# Patient Record
Sex: FEMALE
[De-identification: names, ages, dates, MRNs, and addresses within clinical notes are randomized per-mention and may not be internally consistent; named-entity substitution may affect disease eponyms.]

---

## 2017-01-03 ENCOUNTER — HOSPITAL ENCOUNTER (INPATIENT)
Dept: HOSPITAL 80 - FED | Age: 82
LOS: 2 days | Discharge: HOME | DRG: 195 | End: 2017-01-05
Attending: INTERNAL MEDICINE | Admitting: INTERNAL MEDICINE
Payer: COMMERCIAL

## 2017-01-03 DIAGNOSIS — I10: ICD-10-CM

## 2017-01-03 DIAGNOSIS — R55: ICD-10-CM

## 2017-01-03 DIAGNOSIS — F03.90: ICD-10-CM

## 2017-01-03 DIAGNOSIS — J18.9: Primary | ICD-10-CM

## 2017-01-03 LAB
% IMMATURE GRANULYOCYTES: 0.5 % (ref 0–1.1)
ABSOLUTE IMMATURE GRANULOCYTES: 0.08 10^3/UL (ref 0–0.1)
ABSOLUTE NRBC COUNT: 0 10^3/UL (ref 0–0.01)
ADD DIFF?: NO
ADD MORPH?: NO
ADD SCAN?: NO
ANION GAP SERPL CALC-SCNC: 14 MEQ/L (ref 8–16)
APTT BLD: 25.9 SEC (ref 23–38)
ATYPICAL LYMPHOCYTE FLAG: 0 (ref 0–99)
CALCIUM SERPL-MCNC: 9.3 MG/DL (ref 8.5–10.4)
CHLORIDE SERPL-SCNC: 104 MEQ/L (ref 97–110)
CO2 SERPL-SCNC: 21 MEQ/L (ref 22–31)
COLOR UR: (no result)
CREAT SERPL-MCNC: 0.9 MG/DL (ref 0.6–1)
ERYTHROCYTE [DISTWIDTH] IN BLOOD BY AUTOMATED COUNT: 13.2 % (ref 11.5–15.2)
FRAGMENT RBC FLAG: 0 (ref 0–99)
GFR SERPL CREATININE-BSD FRML MDRD: 59 ML/MIN/{1.73_M2}
GLUCOSE SERPL-MCNC: 101 MG/DL (ref 70–100)
HCT VFR BLD CALC: 36.8 % (ref 38–47)
HGB BLD-MCNC: 12.6 G/DL (ref 12.6–16.3)
INR PPP: 1.08 (ref 0.83–1.16)
LEFT SHIFT FLG: 10 (ref 0–99)
LIPEMIA HEMOLYSIS FLAG: 90 (ref 0–99)
MCH RBC BLDCO QN: 31.3 PG (ref 27.9–34.1)
MCHC RBC AUTO-ENTMCNC: 34.2 G/DL (ref 32.4–36.7)
MCV RBC AUTO: 91.3 FL (ref 81.5–99.8)
NITRITE UR QL STRIP: NEGATIVE
NRBC-AUTO%: 0 % (ref 0–0.2)
PH UR STRIP: 8 [PH] (ref 5–7.5)
PLATELET # BLD: 247 10^3/UL (ref 150–400)
PLATELET CLUMPS FLAG: 0 (ref 0–99)
PMV BLD AUTO: 11 FL (ref 8.7–11.7)
POTASSIUM SERPL-SCNC: 4.5 MEQ/L (ref 3.5–5.2)
PROTHROMBIN TIME: 13.9 SEC (ref 12–15)
RBC # BLD AUTO: 4.03 10^6/UL (ref 4.18–5.33)
SODIUM SERPL-SCNC: 139 MEQ/L (ref 134–144)
SP GR UR STRIP: 1.01 (ref 1–1.03)
TROPONIN I SERPL-MCNC: 0.02 NG/ML (ref 0–0.03)

## 2017-01-03 PROCEDURE — G0378 HOSPITAL OBSERVATION PER HR: HCPCS

## 2017-01-03 NOTE — CPEKG
Heart Rate: 80

RR Interval: 750

P-R Interval: 156

QRSD Interval: 86

QT Interval: 356

QTC Interval: 411

P Axis: 83

QRS Axis: 76

T Wave Axis: -52

EKG Severity - ABNORMAL ECG -

EKG Impression: SINUS RHYTHM

EKG Impression: NONSPECIFIC T ABNORMALITIES, DIFFUSE LEADS

EKG Impression: similar to previous

Electronically Signed By: Akira Rodriguez 03-Jan-2017 12:48:02

## 2017-01-03 NOTE — EDPHY
H & P


Stated Complaint: SYNCOPAL


Time Seen by Provider: 01/03/17 12:39


HPI/ROS: 


CHIEF COMPLAINT:  Syncope





HISTORY OF PRESENT ILLNESS:  The patient is an 88-year-old female who comes to 

the emergency department with her  after a syncopal event.  She was in 

an exercise class with small weights when she lost consciousness and fell to 

the ground.  She denies having any pain or injury from the fall.  She denies 

any chest pain or palpitations or shortness of breath prior to the episode or 

after.  She does not take blood thinners.  She does not have any diarrhea or 

vomiting.  She used to have a diagnosis of Crohn's disease but states that she 

no longer does and that Dr. Traylor took her off of her Remicade.  She was 

admitted this summer under similar circumstances and at that time found to have 

C difficile and pneumonia.  She was treated for these appropriately.   she has 

never had any history of cardiac disease.








REVIEW OF SYSTEMS:


Constitutional:  denies: chills, fever, recent illness, recent injury


EENTM: denies: blurred vision, double vision, nose congestion


Respiratory: denies: cough, shortness of breath


Cardiac:  See HPI


Gastrointestinal/Abdominal: denies: abdominal pain, diarrhea, nausea, vomiting, 

blood streaked stools


Genitourinary: denies: dysuria, frequency, hematuria, pain


Musculoskeletal: denies: joint pain, muscle pain


Skin: denies: lesions, rash, jaundice, bruising


Neurological: denies: headache, numbness, paresthesia, tingling, dizziness, 

weakness


Hematologic/Lymphatic: denies: blood clots, easy bleeding, easy bruising


Immunologic/allergic: denies: HIV/AIDS, transplant








EXAM:


GENERAL:  Well-appearing, well-nourished and in no acute distress.


HEAD:  Atraumatic, normocephalic.


EYES:  Pupils equal round and reactive to light, extraocular movements intact, 

sclera anicteric, conjunctiva are normal.


ENT:  TMs normal, nares patent, oropharynx clear without exudates.  Moist 

mucous membranes.


NECK:  Normal range of motion, supple without lymphadenopathy or JVD.  No 

tenderness


LUNGS:  Breath sounds clear to auscultation bilaterally and equal.  No wheezes 

rales or rhonchi.


HEART:  Regular rate and rhythm without murmurs, rubs or gallops.


ABDOMEN:  Soft, nontender, normoactive bowel sounds.  No guarding, no rebound.  

No masses appreciated.


BACK:  No CVA tenderness, no spinal tenderness, step-offs or deformities


EXTREMITIES:  Normal range of motion, no pitting or edema.  No clubbing or 

cyanosis.


NEUROLOGICAL:  Cranial nerves II through XII grossly intact.  Normal speech, 

normal gait.  5/5 strength, normal movement in all extremities, normal sensation


PSYCH:  Normal mood, normal affect.


SKIN:  Warm, dry, normal turgor, no visible rashes or lesions.








Source: Patient


Exam Limitations: No limitations





- Personal History


Current Tetanus/Diphtheria Vaccine: Unsure


Current Tetanus Diphtheria and Acellular Pertussis (TDAP): Unsure





- Medical/Surgical History


Hx Asthma: No


Hx Chronic Respiratory Disease: No


Hx Diabetes: No


Hx Cardiac Disease: No


Hx Renal Disease: No


Hx Cirrhosis: No


Hx Alcoholism: No


Hx HIV/AIDS: No


Hx Splenectomy or Spleen Trauma: No


Other PMH: Crohns w/remicaide infusions,





- Family History


Significant Family History: No pertinent family hx





- Social History


Smoking Status: Never smoked


Alcohol Use: None


Drug Use: None


Constitutional: 


 Initial Vital Signs











Temperature (C)  37 C   01/03/17 11:01


 


Heart Rate  81   01/03/17 11:01


 


Respiratory Rate  19   01/03/17 11:01


 


Blood Pressure  156/77 H  01/03/17 11:01


 


O2 Sat (%)  92   01/03/17 11:01








 











O2 Delivery Mode               Room Air














Allergies/Adverse Reactions: 


 





No Known Allergies Allergy (Unverified 03/19/15 11:29)


 








Home Medications: 














 Medication  Instructions  Recorded


 


Ferrous Sulfate [Ferrous Sulf 325 325 mg PO DAILY 07/01/16





MG (*)]  


 


Multivitamins [Multivitamin (*)] 1 each PO DAILY 01/03/17














Medical Decision Making





- Diagnostics


EKG Interpretation: 


An EKG obtained and was read and documented in trace view.  Please see trace 

view for full reading and report.  Sinus rhythm inferior T-wave inversions 

similar to previous 


ED Course/Re-evaluation: 


The patient does not wish to stay in the hospital.  She does appear slightly 

dehydrated.  She is afebrile.  Her vital signs are stable.  Glucose is a normal 

limits. Her previous syncopal episode was associated with pneumonia and C diff.

  She denies having any diarrhea or shortness of breath or cough today.  I will 

obtain a chest x-ray.





2:30 p.m. we discussed the x-ray results.  Initially the patient had refused 

it.  It does reveal bilateral pneumonia.  I recommended admission.  Also 

further workup for her syncope.  She does feel better with hydration.  She and 

her  agree with this plan.  I discussed the case with Dr. Yuliya Zheng 

who will admit.  I have started her on Levaquin.  Lactic and blood cultures 

ordered.


Differential Diagnosis: 


Partial list of the Differential diagnosis considered include but were not 

limited to;  pneumonia, bronchitis, syncope, arrhythmia, dehydration, 

hypoglycemia and although unlikely based on the history and physical exam, I 

also considered seizure, acute coronary disease.  








- Data Points


Laboratory Results: 


 Laboratory Results





 01/03/17 11:50 





 01/03/17 10:50 








Medications Given: 


 








Discontinued Medications





Sodium Chloride (Ns)  1,000 mls @ 0 mls/hr IV EDNOW ONE


   PRN Reason: Wide Open


   Stop: 01/03/17 11:18


   Last Admin: 01/03/17 11:20 Dose:  1,000 mls


Sodium Chloride (Ns)  1,000 mls @ 0 mls/hr IV ONCE ONE


   PRN Reason: Wide Open


   Stop: 01/03/17 12:49


   Last Admin: 01/03/17 12:00 Dose:  500 mls


Levofloxacin/Dextrose (Levaquin 750 Mg (Premix))  150 mls @ 100 mls/hr IV DAILY 

HUMBLE


   PRN Reason: Protocol


   Stop: 02/02/17 14:59


   Last Admin: 01/03/17 15:29 Dose:  150 mls


Sodium Chloride (Ns *For Sepsis Order Set Only*)  1,388 ml IV EDNOW ONE


   Stop: 01/03/17 14:37


   Last Admin: 01/03/17 15:55 Dose:  Not Given








Departure





- Departure


Disposition: [facility] Inpatient Acute


Clinical Impression: 


 Community acquired pneumonia





Syncope


Qualifiers:


 Syncope type: unspecified Qualifier Code: (R55) Syncope and collapse


Condition: Fair

## 2017-01-03 NOTE — DX
Chest, Two Views 

January 3, 2017, 1316 Hours

 

History: Chest pain, syncope.

 

Comparison: July 2016.

 

Findings:   Cardiac silhouette is within normal range. Alveolar opacity in the left lower lobe probab
ly representing pneumonia. Opacity also in the anterior segment of the right upper lobe probably repr
esenting pneumonia. Bilateral peribronchial thickening. No pleural effusion or pneumothorax.

 

Impressions

1. Left lower lobe pneumonia.

2. Right upper lobe anterior segment pneumonia.

3. Consider CT chest imaging.

## 2017-01-03 NOTE — GHP
[f 
rep st]



                                                            HISTORY AND PHYSICAL





DATE OF ADMISSION:  01/03/2017



CHIEF COMPLAINT:  Syncope.



HISTORY OF PRESENT ILLNESS:  Patient is a pleasant 88-year-old female with 
history of Crohn's rheumatoid arthritis who presented after a syncopal event.  
She was in her exercise class this morning and suddenly fainted.  Per her 
, when she awoke she was confused for about a minute, was sweating and 
complained of nausea.  There were no jerking movements.  The patient denies 
prodrome of symptoms including chest pain, shortness of breath, nausea, vomiting
, clamminess or palpitations.  She had a similar presentation in July with a 
syncopal episode and found to have pneumonia and C difficile. 



She denies fevers, chills or sweats.  She has had decreased p.o. intake.  Per 
her , has been sleeping more at night, up to 8-9 hours plus a nap during 
the day.  She denies nausea, vomiting or diarrhea and no headache.  The  
says there has been flu like symptoms in their living facility.



REVIEW OF SYSTEMS:  A complete 10-point review of systems, negative except as 
noted in HPI.



PAST MEDICAL HISTORY:  

1.  Crohn's. 

2.  Rheumatoid arthritis.  

3.  C difficile in July 2016. 

4.  Community-acquired pneumonia in July 2016.

5.  Dementia.



PAST SURGICAL HISTORY:  None.



MEDICATIONS:  None.



SOCIAL HISTORY:  Lives at the [facility] with her  in the independent 
portion.  Has 3 kids.  No alcohol, tobacco or illicits.



FAMILY HISTORY:  Dad with hypertension.



PHYSICAL EXAM:  VITAL SIGNS:  Temperature is 37.2, blood pressure 163/89, heart 
rate 80s, respiration 16, 91% on room air.  GENERAL:  Patient is lying up in bed
, in no acute distress.  HEENT:  PERRLA.  EOMI.  Dry mucous membranes.  
Oropharynx clear without exudate or erythema.  CARDIAC:  Regular rate and 
rhythm.  No murmurs, gallops, or rubs.  LUNGS:  Clear to auscultation 
bilaterally.  ABDOMEN:  Soft, nontender, nondistended.  Positive bowel sounds.  
No suprapubic tenderness.  MUSCULOSKELETAL:  Moving all 4 extremities.  NEURO:  
2 through 12 intact.  No focal deficits.  PSYCH:  Alert and oriented x3.



LABS:  WBC 17, hemoglobin 12, hematocrit 36, platelets 247.  Lactate is 1.3.  
Sodium is 139, potassium 4.5, chloride 104, carbon dioxide 21, BUN 13, 
creatinine 0.9, anion gap 14, glucose 101, calcium 9.3.  Troponin 0.019. 



EKG is personally reviewed by me.  ST flattening in V4 through V6. 



Chest x-ray is personally reviewed by me.  Small left lower lobe opacity, no 
effusion.



ASSESSMENT/PLAN:  

1.  Syncope, differential includes infection versus cardiac versus dehydration.
  The patient does not elicit overt infectious symptoms but has had decreased 
p.o. intake and more fatigued.  She has an elevated white count to 17.  There 
is a subtle left lower lobe opacity noted on x-ray.  She was treated with 
Levaquin in the emergency room.  I will continue this medication.  Also check 
influenza and a UA. Subtle left lower lobe pneumonia. Blood cultures are 
pending.  Will monitor patient on telemetry.  EKG and troponin negative for 
ischemia.  Repeat troponin.

2.  Dementia.  Patient lives independently at the [facility] with her .

3.  Accelerated hypertension.  Suspect this is due to acute illness.  Will 
monitor.  

4.  Diet:  Regular.

5.  DVT prophylaxis:  Low molecular weight heparin.



DISPOSITION:  Patient warrants observation admission given acute syncopal 
episode warranting serial troponin and telemetry.  If patient is feeling better 
in the morning, suspect that she can be discharged with her .





Job #:  [id number]

MTDD

## 2017-01-04 LAB
ANION GAP SERPL CALC-SCNC: 12 MEQ/L (ref 8–16)
CALCIUM SERPL-MCNC: 9.3 MG/DL (ref 8.5–10.4)
CHLORIDE SERPL-SCNC: 105 MEQ/L (ref 97–110)
CO2 SERPL-SCNC: 23 MEQ/L (ref 22–31)
CREAT SERPL-MCNC: 0.8 MG/DL (ref 0.6–1)
ERYTHROCYTE [DISTWIDTH] IN BLOOD BY AUTOMATED COUNT: 13.2 % (ref 11.5–15.2)
GFR SERPL CREATININE-BSD FRML MDRD: > 60 ML/MIN/{1.73_M2}
GLUCOSE SERPL-MCNC: 107 MG/DL (ref 70–100)
HCT VFR BLD CALC: 37.1 % (ref 38–47)
HGB BLD-MCNC: 12.8 G/DL (ref 12.6–16.3)
LIPEMIA HEMOLYSIS FLAG: 90 (ref 0–99)
MCH RBC BLDCO QN: 30.9 PG (ref 27.9–34.1)
MCHC RBC AUTO-ENTMCNC: 34.5 G/DL (ref 32.4–36.7)
MCV RBC AUTO: 89.6 FL (ref 81.5–99.8)
PLATELET # BLD: 275 10^3/UL (ref 150–400)
PLATELET CLUMPS FLAG: 99 (ref 0–99)
POTASSIUM SERPL-SCNC: 4.3 MEQ/L (ref 3.5–5.2)
RBC # BLD AUTO: 4.14 10^6/UL (ref 4.18–5.33)
SODIUM SERPL-SCNC: 140 MEQ/L (ref 134–144)

## 2017-01-04 RX ADMIN — VANCOMYCIN HYDROCHLORIDE SCH MG: KIT at 15:09

## 2017-01-04 RX ADMIN — VANCOMYCIN HYDROCHLORIDE SCH MG: KIT at 20:06

## 2017-01-04 RX ADMIN — IRON SUPPLEMENT SCH MG: 325 TABLET ORAL at 09:26

## 2017-01-04 RX ADMIN — THERA TABS SCH EACH: TAB at 09:26

## 2017-01-04 RX ADMIN — ENOXAPARIN SODIUM SCH MG: 100 INJECTION SUBCUTANEOUS at 09:26

## 2017-01-04 NOTE — HOSPPROG
Hospitalist Progress Note


Assessment/Plan: 


Patient is a 89y/o female who had a syncopal event after doing an exercise 

class.  Reviewed her H & P, PMH, today is my first day caring for Shikha.  





#. CAP


Levaquin/ chest xray shows a left lower lobe





#.  leukocytosis


 white blood cell count increased today


 awaiting blood cultures


 negative for influenza





#.  syncopal event


 troponin negative


 patient's was stating she was doing a workout class and fell and has no 

recollection


 will get an echocardiogram  for further evaluation


  reviewed her telemetry monitor.  She has been in sinus rhythm.


 will ask the nursing staff to do 1 set of orthostatic vital signs





#.  history of Clostridium difficile in 2016


 because of the elevated white blood cell count, will prophylactically treat 

her with oral vancomycin twice daily  while on antibiotics





#.  dementia


 this appears to be very mild during my interview.  She lives independently at 

the [facility] with her 





#.  hypertension


 blood pressure is 142/80.  Will continue monitoring





3.  history of Crohn's disease/treated with Remicade


 has seen Dr. Traylor in the past for this





#.  DVT prophylaxis/ low molecular weight heparin





#.  plan.  Patient require another midnight stay.  I am concerned that her 

white blood cell count has increased.  Will get repeat labs in the morning, and 

echocardiogram, and a set of orthostatic vital signs.








Subjective: Shikha has no complaints of pain.  Overall is feeling fine


Objective: 


 Vital Signs











Temp Pulse Resp BP Pulse Ox


 


 36.6 C   79   16   142/80 H  94 


 


 01/04/17 08:00  01/04/17 08:00  01/04/17 08:00  01/04/17 08:00  01/04/17 08:00








 Laboratory Results





 01/04/17 05:36 





 01/04/17 05:36 





 











 01/03/17 01/04/17 01/05/17





 05:59 05:59 05:59


 


Intake Total  1500 


 


Output Total  550 


 


Balance  950 








 











PT  13.9 SEC (12.0-15.0)   01/03/17  15:48    


 


INR  1.08  (0.83-1.16)   01/03/17  15:48    














- Physical Exam


Constitutional: no apparent distress, appears nourished, not in pain


Eyes: PERRL, EOMI


Cardiovascular: regular rate and rhythym, no murmur, rub, or gallop


Respiratory: no respiratory distress, no rales or rhonchi, reduced air movement 

( left base)


Gastrointestinal: normoactive bowel sounds, soft, non-tender abdomen


Musculoskeletal: full muscle strength, no muscle tenderness


Neurologic: AAOx3


Psychiatric: interacting appropriately, not anxious





ICD10 Worksheet


Patient Problems: 


 Problems











Problem Status Diagnosed


 


Chronic Disease Mgmt/Transitional Care Acute 


 


Community acquired pneumonia Acute 


 


Leukocytosis Acute 


 


Syncope Acute 


 


C. difficile diarrhea Acute 07/02/16

## 2017-01-05 VITALS
SYSTOLIC BLOOD PRESSURE: 115 MMHG | OXYGEN SATURATION: 92 % | TEMPERATURE: 97.8 F | RESPIRATION RATE: 16 BRPM | DIASTOLIC BLOOD PRESSURE: 74 MMHG

## 2017-01-05 VITALS — HEART RATE: 91 BPM

## 2017-01-05 LAB
% IMMATURE GRANULYOCYTES: 0.5 % (ref 0–1.1)
ABSOLUTE IMMATURE GRANULOCYTES: 0.07 10^3/UL (ref 0–0.1)
ABSOLUTE NRBC COUNT: 0 10^3/UL (ref 0–0.01)
ADD DIFF?: NO
ADD MORPH?: NO
ADD SCAN?: NO
ANION GAP SERPL CALC-SCNC: 9 MEQ/L (ref 8–16)
ATYPICAL LYMPHOCYTE FLAG: 0 (ref 0–99)
CALCIUM SERPL-MCNC: 9 MG/DL (ref 8.5–10.4)
CHLORIDE SERPL-SCNC: 105 MEQ/L (ref 97–110)
CO2 SERPL-SCNC: 23 MEQ/L (ref 22–31)
CREAT SERPL-MCNC: 0.8 MG/DL (ref 0.6–1)
ERYTHROCYTE [DISTWIDTH] IN BLOOD BY AUTOMATED COUNT: 13.1 % (ref 11.5–15.2)
FRAGMENT RBC FLAG: 0 (ref 0–99)
GFR SERPL CREATININE-BSD FRML MDRD: > 60 ML/MIN/{1.73_M2}
GLUCOSE SERPL-MCNC: 99 MG/DL (ref 70–100)
HCT VFR BLD CALC: 34.4 % (ref 38–47)
HGB BLD-MCNC: 11.9 G/DL (ref 12.6–16.3)
LEFT SHIFT FLG: 10 (ref 0–99)
LIPEMIA HEMOLYSIS FLAG: 90 (ref 0–99)
MCH RBC BLDCO QN: 31.2 PG (ref 27.9–34.1)
MCHC RBC AUTO-ENTMCNC: 34.6 G/DL (ref 32.4–36.7)
MCV RBC AUTO: 90.1 FL (ref 81.5–99.8)
NRBC-AUTO%: 0 % (ref 0–0.2)
PLATELET # BLD: 225 10^3/UL (ref 150–400)
PLATELET CLUMPS FLAG: 0 (ref 0–99)
PMV BLD AUTO: 11.2 FL (ref 8.7–11.7)
POTASSIUM SERPL-SCNC: 4 MEQ/L (ref 3.5–5.2)
RBC # BLD AUTO: 3.82 10^6/UL (ref 4.18–5.33)
SODIUM SERPL-SCNC: 137 MEQ/L (ref 134–144)

## 2017-01-05 RX ADMIN — ENOXAPARIN SODIUM SCH MG: 100 INJECTION SUBCUTANEOUS at 09:36

## 2017-01-05 RX ADMIN — IRON SUPPLEMENT SCH MG: 325 TABLET ORAL at 09:37

## 2017-01-05 RX ADMIN — VANCOMYCIN HYDROCHLORIDE SCH MG: KIT at 09:37

## 2017-01-05 RX ADMIN — THERA TABS SCH EACH: TAB at 09:37

## 2017-01-05 NOTE — HOSPPROG
Hospitalist Progress Note


Assessment/Plan: 


Patient is a 87y/o female who had a syncopal event after doing an exercise 

class.  





#. CAP


Levaquin/ chest xray shows a left lower lobe


blood cx show no growth thus far





#.  leukocytosis


improved today


 negative for influenza





#.  syncopal event


 troponin negative


 patient's was stating she was doing a workout class and fell and has no 

recollection


 echocardiogram Shows normal LV function.  EF 65-70%.  Has moderate TR and mild 

MR


  reviewed her telemetry monitor.  She has been in sinus rhythm.


  orthostatic stable/ will arrange for her to get a Holter monitor and further 

follow up with Cardiology


 suspect she may have been slightly dehydrated when she attended her exercise 

class





#.  history of Clostridium difficile in 2016


 no diarrhea





#.  dementia


 this appears to be very mild during my interview.  She lives independently at 

the [facility] with her 





#.  hypertension


 blood pressure is 141/75  





3.  history of Crohn's disease/treated with Remicade


 has seen Dr. Traylor in the past for this





#.  DVT prophylaxis/ low molecular weight heparin





#.  plan.  discharge home.  Due to the snow, case Management is actively 

involved arranging for her to get home








Subjective: Shikha is anxious to be discharged home immediately.


Objective: 


 Vital Signs











Temp Pulse Resp BP Pulse Ox


 


 36.5 C   91   18   141/75 H  91 L


 


 01/05/17 04:00  01/05/17 04:00  01/05/17 04:00  01/05/17 04:00  01/05/17 04:00








 Laboratory Results





 01/05/17 05:17 





 01/05/17 05:17 





 











 01/04/17 01/05/17 01/06/17





 05:59 05:59 05:59


 


Intake Total  800 


 


Output Total  600 


 


Balance  200 








 











PT  13.9 SEC (12.0-15.0)   01/03/17  15:48    


 


INR  1.08  (0.83-1.16)   01/03/17  15:48    














- Physical Exam


Constitutional: no apparent distress, appears nourished


Eyes: PERRL


Ears, Nose, Mouth, Throat: hearing normal


Cardiovascular: regular rate and rhythym, no murmur, rub, or gallop


Respiratory: no respiratory distress, no rales or rhonchi, reduced air movement 

( left base)


Gastrointestinal: normoactive bowel sounds, soft, non-tender abdomen


Skin: warm


Musculoskeletal: full muscle strength


Neurologic: AAOx3


Psychiatric: interacting appropriately, poor insight, poor memory





ICD10 Worksheet


Patient Problems: 


 Problems











Problem Status Diagnosed


 


Chronic Disease Mgmt/Transitional Care Acute 


 


Community acquired pneumonia Acute 


 


Leukocytosis Acute 


 


Syncope Acute 


 


C. difficile diarrhea Acute 07/02/16

## 2017-01-05 NOTE — GDS
[f 
rep st]



                                                             DISCHARGE SUMMARY





DISCHARGE DIAGNOSES:  

1.  Community-acquired pneumonia.  

2.  Leukocytosis. 

3.  Syncopal event.

4.  History of Clostridium difficile in 2016. 

5.  Dementia. 

6.  Hypertension. 

7.  History of Crohn disease, had been treated with Remicade in the past.



BRIEF HISTORY OF PRESENT ILLNESS:  Patient is an 88-year-old female with a 
history of Crohn's inflammatory arthritis, who presented after a syncopal 
event.  She was in her exercise class and suddenly fainted.  Per her , 
when she awoke she was confused.  She had no prodrome of symptoms.  Per her 
, she has not been taking in as much intake.  She was admitted for 
further evaluation.



HOSPITAL COURSE:  

1.  Community-acquired pneumonia.  She had a chest x-ray performed which showed 
a left lower lobe pneumonia and right upper lobe anterior segment pneumonia.  
She was treated with Levaquin.  Her current blood cultures show no growth.  
Recommended that she follow up with her primary care provider and get a repeat 
chest x-ray in 4-6 weeks.

2.  Leukocytosis.  This is improved.  She has been afebrile.  She is negative 
for influenza.

3.  Syncopal event.  Troponin are negative.  She has been in a sinus rhythm.  
An echocardiogram was performed which showed normal LV function with an EF of 65
% to 70%.  She has moderate TR and mild MR.  Her orthostatic vital signs are 
stable.  She will get an outpatient Holter monitor and follow up with 
Cardiology.  Cardiology will contact her tomorrow for follow up.

4.  Clostridium difficile in 2016.  No diarrhea. 

5.  Dementia, overall stable.

6.  Hypertension.  Blood pressure is 141/75.

7.  History of Crohn disease.  This was treated with Remicade in the past.  
Further follow up with Dr. Traylor.



PENDING LABS AND TESTS:  None.



CONDITION AT DISCHARGE:  Stable.  Blood pressure is 115/74, heart rate is 91, 
respiratory rate 16, O2 saturation on room air 92%, temperature 36.6 Celsius.



MEDICATIONS AT DISCHARGE:  Please see the EMR.



DISCHARGE INSTRUCTIONS:  

1.  Stay well hydrated and eat well before working out.

2.  [facility] will call her to arrange for Holter monitor to be sent to her 
and to get further followup appointment.

3.  Take Levaquin as prescribed.  Recommend that she take it easy while on this 
medication because it can cause tendon rupture.

4.  Get a repeat chest x-ray in 4-6 weeks to make sure her pneumonia is cleared 
up. 

Greater than 30 minutes discharging and coordinating care.





Job #:  [id number]

MTDD

## 2017-02-13 ENCOUNTER — HOSPITAL ENCOUNTER (OUTPATIENT)
Dept: HOSPITAL 80 - BMCIMAGING | Age: 82
End: 2017-02-13
Attending: INTERNAL MEDICINE
Payer: COMMERCIAL

## 2017-02-13 DIAGNOSIS — J44.9: ICD-10-CM

## 2017-02-13 DIAGNOSIS — J18.1: Primary | ICD-10-CM

## 2017-02-13 NOTE — DX
Chest, Two Views - February 13, 2016, at 1351 hours



History:  Pneumonia, follow up J18.8.



Comparison: Chest x-ray January 3, 2017 CT chest July 2016.



Findings: Cardiac silhouette is within normal range. Hyperinflation of the lungs, consistent with 
D. Lower thoracic levoscoliosis. Atherosclerotic aorta.



Increasing opacity in the left lower lobe posterior basal segment and in the lingula, consistent with
 worsening pneumonia. Increased linear density in the right major fissure representing scarring or pn
eumonitis in the anterior segment of the right upper lobe.



Impression:

1. Worsening left lower lobe and lingula pneumonia.

2. Probable pneumonia or atelectasis in the anterior segment of the right upper lobe with linear dens
ity.

3. COPD.

4. Consider additional CT chest imaging if clinically indicated.

## 2017-02-15 ENCOUNTER — HOSPITAL ENCOUNTER (OUTPATIENT)
Dept: HOSPITAL 80 - FIMAGING | Age: 82
End: 2017-02-15
Attending: INTERNAL MEDICINE
Payer: COMMERCIAL

## 2017-02-15 DIAGNOSIS — R91.8: Primary | ICD-10-CM

## 2017-02-15 PROCEDURE — 71260 CT THORAX DX C+: CPT

## 2017-02-28 ENCOUNTER — HOSPITAL ENCOUNTER (OUTPATIENT)
Dept: HOSPITAL 80 - BHFA | Age: 82
End: 2017-02-28
Payer: COMMERCIAL

## 2017-02-28 DIAGNOSIS — R55: Primary | ICD-10-CM

## 2017-08-07 ENCOUNTER — HOSPITAL ENCOUNTER (OUTPATIENT)
Dept: HOSPITAL 80 - BMCIMAGING | Age: 82
End: 2017-08-07
Attending: INTERNAL MEDICINE
Payer: COMMERCIAL

## 2017-08-07 DIAGNOSIS — R63.4: Primary | ICD-10-CM

## 2017-08-14 ENCOUNTER — HOSPITAL ENCOUNTER (INPATIENT)
Dept: HOSPITAL 80 - FED | Age: 82
LOS: 3 days | Discharge: HOME HEALTH | DRG: 640 | End: 2017-08-17
Attending: INTERNAL MEDICINE | Admitting: INTERNAL MEDICINE
Payer: COMMERCIAL

## 2017-08-14 DIAGNOSIS — F50.89: ICD-10-CM

## 2017-08-14 DIAGNOSIS — R63.0: Primary | ICD-10-CM

## 2017-08-14 DIAGNOSIS — D63.8: ICD-10-CM

## 2017-08-14 DIAGNOSIS — R62.7: ICD-10-CM

## 2017-08-14 DIAGNOSIS — E43: ICD-10-CM

## 2017-08-14 DIAGNOSIS — F03.90: ICD-10-CM

## 2017-08-14 DIAGNOSIS — K44.9: ICD-10-CM

## 2017-08-14 DIAGNOSIS — K50.90: ICD-10-CM

## 2017-08-14 DIAGNOSIS — E87.1: ICD-10-CM

## 2017-08-14 LAB
% IMMATURE GRANULYOCYTES: 0.7 % (ref 0–1.1)
ABSOLUTE IMMATURE GRANULOCYTES: 0.11 10^3/UL (ref 0–0.1)
ABSOLUTE NRBC COUNT: 0 10^3/UL (ref 0–0.01)
ADD DIFF?: NO
ADD MORPH?: NO
ADD SCAN?: NO
ANION GAP SERPL CALC-SCNC: 7 MEQ/L (ref 8–16)
ATYPICAL LYMPHOCYTE FLAG: 10 (ref 0–99)
BACTERIA #/AREA URNS HPF: (no result) /HPF
CALCIUM SERPL-MCNC: 9 MG/DL (ref 8.5–10.4)
CHLORIDE SERPL-SCNC: 100 MEQ/L (ref 97–110)
CO2 SERPL-SCNC: 25 MEQ/L (ref 22–31)
COLOR UR: YELLOW
CREAT SERPL-MCNC: 0.8 MG/DL (ref 0.6–1)
ERYTHROCYTE [DISTWIDTH] IN BLOOD BY AUTOMATED COUNT: 12.7 % (ref 11.5–15.2)
FRAGMENT RBC FLAG: 0 (ref 0–99)
GFR SERPL CREATININE-BSD FRML MDRD: > 60 ML/MIN/{1.73_M2}
GLUCOSE SERPL-MCNC: 83 MG/DL (ref 70–100)
HCT VFR BLD CALC: 35.1 % (ref 38–47)
HGB BLD-MCNC: 11.5 G/DL (ref 12.6–16.3)
LEFT SHIFT FLG: 10 (ref 0–99)
LIPEMIA HEMOLYSIS FLAG: 80 (ref 0–99)
MCH RBC BLDCO QN: 29.5 PG (ref 27.9–34.1)
MCHC RBC AUTO-ENTMCNC: 32.8 G/DL (ref 32.4–36.7)
MCV RBC AUTO: 90 FL (ref 81.5–99.8)
MUCOUS THREADS #/AREA URNS LPF: (no result) /LPF
NITRITE UR QL STRIP: NEGATIVE
NRBC-AUTO%: 0 % (ref 0–0.2)
PH UR STRIP: 5 [PH] (ref 5–7.5)
PLATELET # BLD: 547 10^3/UL (ref 150–400)
PLATELET CLUMPS FLAG: 0 (ref 0–99)
PMV BLD AUTO: 9.3 FL (ref 8.7–11.7)
POTASSIUM SERPL-SCNC: 4.6 MEQ/L (ref 3.5–5.2)
RBC # BLD AUTO: 3.9 10^6/UL (ref 4.18–5.33)
RBC #/AREA URNS HPF: (no result) /HPF (ref 0–3)
SODIUM SERPL-SCNC: 132 MEQ/L (ref 134–144)
SP GR UR STRIP: > 1.035 (ref 1–1.03)
WBC #/AREA URNS HPF: (no result) /HPF (ref 0–3)

## 2017-08-14 PROCEDURE — C1751 CATH, INF, PER/CENT/MIDLINE: HCPCS

## 2017-08-14 PROCEDURE — A9537 TC99M MEBROFENIN: HCPCS

## 2017-08-14 RX ADMIN — SODIUM CHLORIDE SCH MLS: 900 INJECTION, SOLUTION INTRAVENOUS at 18:09

## 2017-08-14 RX ADMIN — SUCRALFATE SCH GM: 1 TABLET ORAL at 20:27

## 2017-08-14 RX ADMIN — PANTOPRAZOLE SODIUM SCH MG: 40 TABLET, DELAYED RELEASE ORAL at 20:27

## 2017-08-14 NOTE — CPEKG
Heart Rate: 78

RR Interval: 769

P-R Interval: 144

QRSD Interval: 82

QT Interval: 368

QTC Interval: 420

P Axis: 87

QRS Axis: 76

T Wave Axis: 51

EKG Severity - NORMAL ECG -

EKG Impression: SINUS RHYTHM

Electronically Signed By: Frieda Hoff 14-Aug-2017 21:42:40

## 2017-08-14 NOTE — EDPHY
H & P


Time Seen by Provider: 08/14/17 14:17


HPI/ROS: 


CHIEF COMPLAINT:  Lack of appetite, generalized weakness





HISTORY OF PRESENT ILLNESS: The patient is an 88-year-old female presenting 

with lack of appetite.  Onset of lack of appetite 2.5 weeks ago.  Associated 

with gradually an 8 lb weight loss, increasing generalized weakness and 

fatigue.  She has been mainly lying in bed and sleeping a lot.  The patient 

denies abdominal pain, nausea, vomiting, or diarrhea.  The patient and her 

 feel that the symptoms are secondary to Crohn's disease.  However she 

has no abdominal pain or GI symptoms. The patient was on Remicade for 10 years 

after presenting with similar symptoms. She was asymptomatic during that 10 

year period.  She was told to stop Remicade 1 year ago.  Over the past 2 and 

half weeks, she has seen her PCP, a GI specialist and hematologist for the 

symptoms. Patient is told she has severe anemia and an outpatient abdomen CT 

was ordered. 





REVIEW OF SYSTEMS:


A comprehensive 10 point review of systems is otherwise negative aside from 

elements mentioned in the history of present illness.


Past Medical/Surgical History: 





Anemia, Crohn's previously on Remicade. 


Social History: 


. Lives with  at home. 


Smoking Status: Never smoked


Physical Exam: 





General Appearance: Alert, pleasant


Eyes: Pupils equal and round, no conjunctival pallor or injection


ENT, Mouth: Mucous membranes moist


Neck: Normal inspection


Respiratory: Lungs are clear to auscultation


Cardiovascular: Regular rate and rhythm 


Gastrointestinal:  Abdomen is soft and non-tender 


Neurological:  A&O, nonfocal exam


Skin:  Pale appearing


Extremities:  Normal inspection, no tenderness


Psychiatric: Mood and affect normal





Constitutional: 


 Initial Vital Signs











Temperature (C)  36.9 C   08/14/17 12:49


 


Heart Rate  86   08/14/17 12:49


 


Respiratory Rate  16   08/14/17 12:49


 


Blood Pressure  121/62 H  08/14/17 12:49


 


O2 Sat (%)  94   08/14/17 12:49








 











O2 Delivery Mode               Room Air














Allergies/Adverse Reactions: 


 





No Known Allergies Allergy (Verified 08/14/17 12:48)


 








Home Medications: 














 Medication  Instructions  Recorded


 


Bifidobacterium Infantis [Align] 4 mg PO DAILY 08/14/17


 


Multivitamins [Multivitamin (*)] 1 each PO DAILY 08/14/17














Medical Decision Making





- Diagnostics


EKG Interpretation: 





EKG interpreted by me reveals normal sinus rhythm, normal axis, normal intervals

, ST and T segments normal.  Interpretation: normal EKG


     


Imaging: Discussed imaging studies w/ On call Radiologist


ED Course/Re-evaluation: 





This patient presents with lack of appetite, generalized weakness and 

leukocytosis.  Old medical record reviewed.  IV normal saline given for 

dehydration.  Plan to check lab work. CT abdomen/pelvis ordered.  





CT is unchanged from prior CT I reveals inflammation of the descending colon. 

Please see imaging section for the full radiology report. 





1610: I consulted the hospitalist, Dr. Cuevas, who accepts patient for 

admission. 


Differential Diagnosis: 





Differential diagnosis includes though it is not limited to appendicitis, 

cholecystitis, diverticulitis, pyelonephritis, bowel perforation, small bowel 

obstruction.





- Data Points


Laboratory Results: 


 Laboratory Results





 08/14/17 15:16 





 08/14/17 15:16 








Medications Given: 


 





Enoxaparin Sodium (Lovenox)  40 mg SC DAILY HUMBLE


   Stop: 02/11/18 08:59


   Last Admin: 08/15/17 11:49 Dose:  Not Given


Sodium Chloride (Ns)  1,000 mls @ 100 mls/hr IV CONT HUMBLE


   Stop: 02/10/18 17:29


   Last Admin: 08/14/17 18:09 Dose:  1,000 mls


Megestrol Acetate (Megace)  40 mg PO QID HUMBLE


   Stop: 02/11/18 15:59


   Last Admin: 08/15/17 18:19 Dose:  Not Given


Miscellaneous Medication (Bifidobacterium Infantis [Align])  4 mg PO DAILY HUMBLE


   Stop: 02/11/18 08:59


   Last Admin: 08/15/17 09:18 Dose:  Not Given


Multivitamins (Tab-A-Mao)  1 each PO DAILY HUMBLE


   Stop: 02/11/18 08:59


   Last Admin: 08/15/17 09:14 Dose:  1 each


Pantoprazole Sodium (Protonix)  40 mg PO BID HUMBLE


   Stop: 02/10/18 20:59


   Last Admin: 08/15/17 09:14 Dose:  40 mg


Sucralfate (Carafate)  1 gm PO ACHS HUMBLE


   Stop: 02/10/18 20:59


   Last Admin: 08/15/17 18:19 Dose:  Not Given





Discontinued Medications





Diphtheria/Tetanus/Acell Pertussis (Boostrix)  0.5 ml IM .ONCE ONE


   Stop: 08/14/17 14:47


   Last Admin: 08/14/17 15:07 Dose:  Not Given


Sodium Chloride (Ns)  1,000 mls @ 0 mls/hr IV EDNOW ONE; Wide Open


   PRN Reason: Protocol


   Stop: 08/14/17 12:57


   Last Admin: 08/14/17 14:33 Dose:  1,000 mls


Cefazolin Sodium/Dextrose (Ancef 1 Gm (Premix))  50 mls @ 200 mls/hr IV EDNOW 

ONE


   PRN Reason: Protocol


   Stop: 08/14/17 14:59


   Last Admin: 08/14/17 18:15 Dose:  Not Given








Departure





- Departure


Disposition: [facility] Inpatient Acute


Clinical Impression: 


 Generalized weakness





Failure to thrive


Qualifiers:


 Failure to thrive age range: in adult Qualified Code(s): R62.7 - Adult failure 

to thrive





Condition: Fair


Report Scribed for: Frieda Hoff


Report Scribed by: Alexandra Gundersen


Date of Report: 08/14/17


Time of Report: 14:32


Physician Review and Approval Statement: 





08/14/17 14:32


Portions of this note were transcribed by a medical scribe. I personally 

performed the history, physical exam, and medical decision-making; and 

confirmed the accuracy of the information in the transcribed note.

## 2017-08-14 NOTE — PDGENHP
History and Physical





- Chief Complaint


Acute anorexia





- History of Present Illness


PCP: Dr. Armenta


GI: Dr. Tarylor


Heme: Dr. Sanchez





HPI: 87 yo F p/w acute anorexia characterized as very poor oral intake of both 

solids and liquids with associated fatigue and declining ambulatory abilities, 

with onset of symptoms 2.5 weeks ago, duration persistent thereafter. She 

denies any overt nausea/abdominal pain, does have approx 1 loose, non-bloody BM 

daily. She denies urinary symptoms, denies fever/chills, denies dysphagia. She 

reports that she feels hungry, but then her  also states that often, 

after just a couple bites of food, patient stops eating w/o particular 

explanation. This is in the context of approx 6 month "decline" per , 

occurring approx 1 year after stopping Remicade, which she had been receiving q 

2 weeks for the past 15 years. She been seen by her PCP office three times for 

this issue in the recent past, and has also seen Dr. Sanchez for persistent 

leukocytosis (believed to be 2/2 MGUS) recently. 





History Information





- Allergies/Home Medication List


Allergies/Adverse Reactions: 








No Known Allergies Allergy (Verified 08/14/17 12:48)


 





Home Medications: 








Bifidobacterium Infantis [Align] 4 mg PO DAILY 08/14/17 [Last Taken 08/13/17]


Multivitamins [Multivitamin (*)] 1 each PO DAILY 08/14/17 [Last Taken 08/13/17]





I have personally reviewed and updated: family history, medical history, social 

history, surgical history





- Past Medical History


Additional medical history: Reported Crohn's disease w/ 15 years of remicade, 

stopped 18 months ago b/c no active symptoms.  Dementia w/ baseline AAOx3 and 

able to converse, main sx is perseveration and poor insight.  HTN.  C.Diff.  

MGUS





- Surgical History


Additional surgical history: Last EGD/Colon in 2006, reportedly unremarkable





- Family History


Additional family history: Father HTN, no recent sick family contacts





- Social History


Smoking Status: Never smoked


Alcohol Use: None


Drug Use: None


Additional social history: lives at [place] with , retired [employer] professor





Review of Systems


ROS: 10pt was reviewed & negative except for what was stated in HPI & below


Constitutional: Reports: weakness, weight loss, other (fatigue, anorexia)





Physical Exam














Temp Pulse Resp BP Pulse Ox


 


 36.7 C   78   16   122/81 H  96 


 


 08/14/17 17:32  08/14/17 17:32  08/14/17 17:32  08/14/17 17:32  08/14/17 17:32











Constitutional: no apparent distress, not in pain, chronically ill appearing, 

cachectic, No uncomfortable


Eyes: PERRL, anicteric sclera, EOMI


Ears, Nose, Mouth, Throat: moist mucous membranes, hearing normal, ears appear 

normal, no oral mucosal ulcers


Cardiovascular: regular rate and rhythym, no murmur, rub, or gallop, No edema


Respiratory: no respiratory distress, no rales or rhonchi, clear to auscultation


Gastrointestinal: normoactive bowel sounds, soft, non-tender abdomen, no 

palpable masses, No distension


Genitourinary: no bladder fullness, no bladder tenderness


Musculoskeletal: other (prox muscle wasting)


Neurologic: AAOx3, sensation intact bilaterally, No weakness (motor 5/5 bilat)


Psychiatric: not encephalopathic, anxious, other (concentration 7/7, 

perseverating w/o insight), No agitated





Lab Data & Imaging Review





 08/14/17 15:16





 08/14/17 15:16














WBC  15.38 10^3/uL (3.80-9.50)  H  08/14/17  15:16    


 


RBC  3.90 10^6/uL (4.18-5.33)  L  08/14/17  15:16    


 


Hgb  11.5 g/dL (12.6-16.3)  L  08/14/17  15:16    


 


POC Hgb  12.6 gm/dL (12.6-16.3)   08/14/17  15:14    


 


Hct  35.1 % (38.0-47.0)  L  08/14/17  15:16    


 


POC Hct  37 % (38-47)  L  08/14/17  15:14    


 


MCV  90.0 fL (81.5-99.8)   08/14/17  15:16    


 


MCH  29.5 pg (27.9-34.1)   08/14/17  15:16    


 


MCHC  32.8 g/dL (32.4-36.7)   08/14/17  15:16    


 


RDW  12.7 % (11.5-15.2)   08/14/17  15:16    


 


Plt Count  547 10^3/uL (150-400)  H  08/14/17  15:16    


 


MPV  9.3 fL (8.7-11.7)   08/14/17  15:16    


 


Neut % (Auto)  76.1 % (39.3-74.2)  H  08/14/17  15:16    


 


Lymph % (Auto)  15.5 % (15.0-45.0)   08/14/17  15:16    


 


Mono % (Auto)  7.2 % (4.5-13.0)   08/14/17  15:16    


 


Eos % (Auto)  0.1 % (0.6-7.6)  L  08/14/17  15:16    


 


Baso % (Auto)  0.4 % (0.3-1.7)   08/14/17  15:16    


 


Nucleat RBC Rel Count  0.0 % (0.0-0.2)   08/14/17  15:16    


 


Absolute Neuts (auto)  11.70 10^3/uL (1.70-6.50)  H  08/14/17  15:16    


 


Absolute Lymphs (auto)  2.39 10^3/uL (1.00-3.00)   08/14/17  15:16    


 


Absolute Monos (auto)  1.11 10^3/uL (0.30-0.80)  H  08/14/17  15:16    


 


Absolute Eos (auto)  0.01 10^3/uL (0.03-0.40)  L  08/14/17  15:16    


 


Absolute Basos (auto)  0.06 10^3/uL (0.02-0.10)   08/14/17  15:16    


 


Absolute Nucleated RBC  0.00 10^3/uL (0-0.01)   08/14/17  15:16    


 


Immature Gran %  0.7 % (0.0-1.1)   08/14/17  15:16    


 


Immature Gran #  0.11 10^3/uL (0.00-0.10)  H  08/14/17  15:16    


 


POC Sodium  136 mEq/L (134-144)   08/14/17  15:14    


 


Sodium  132 mEq/L (134-144)  L  08/14/17  15:16    


 


POC Potassium  4.2 mEq/L (3.3-5.0)   08/14/17  15:14    


 


Potassium  4.6 mEq/L (3.5-5.2)   08/14/17  15:16    


 


POC Chloride  100 mEq/L ()   08/14/17  15:14    


 


Chloride  100 mEq/L ()   08/14/17  15:16    


 


Carbon Dioxide  25 mEq/l (22-31)   08/14/17  15:16    


 


Anion Gap  7 mEq/L (8-16)  L  08/14/17  15:16    


 


POC BUN  11 mg/dL (7-23)   08/14/17  15:14    


 


BUN  12 mg/dL (7-23)   08/14/17  15:16    


 


Creatinine  0.8 mg/dL (0.6-1.0)   08/14/17  15:16    


 


POC Creatinine  0.9 mg/dL (0.6-1.0)   08/14/17  15:14    


 


Estimated GFR  > 60   08/14/17  15:16    


 


Glucose  83 mg/dL ()   08/14/17  15:16    


 


POC Glucose  89 mg/dL ()   08/14/17  15:14    


 


Calcium  9.0 mg/dL (8.5-10.4)   08/14/17  15:16    








Visualized and Interpreted Chest x-ray results: Yes


Chest X-Ray results: no infiltrate (hyperinflated)


Visualized and Interpreted EKG results: Yes


EKG Interpretation: Positive for: normal sinsus rhythm





Assessment & Plan


Assessment: 








87 yo F p/w acute hyponatremia in setting of anorexia, colitis





Plan: 





# Hyponatremia. Acute, 2/2 hypovolemia and poor oral intake


- give NS 100cc/hr, repeat sNa in AM





# Anorexia. Acute, new problem, further w/u indicated. Unclear etiology, has 

many pro-inflammatory markers (outside records reviewed including ESR 70, CRP 

124, LDH nl, SAMEER elevated from 8/11/17) as well as anemia of chronic 

inflammatory disease (iron 21, TIBC 175, sat 12%, and ferritin elevated at 540)

, and it is unclear whether this is all 2/2 atypical symptoms of Crohn's 

disease vs. other malignant process vs. upper GI issue (PUD)


- d/w Dr. Traylor, he recommends that we empirically tx for possible PUD w/ PPI/

carafate, encourage PO (ensure), and gauge response before proceeding to EGD


- CT demonstrating colitis, but this may be remnant from chronic Crohn's and 

patient does not currently have active symptoms


- her uptrending thrombocytosis and leukocytosis are indicative of a persistent 

and potentially worsening inflammatory process, but she does not appear overtly 

infected (not having C.diff frequency stools)


- get dietary consult, monitor for response of above


- get PT/OT/SLP, may require SNF assistance moving forward, in short term





# MGUS. D/w Dr. Sanchez, he does not believe that this is the primary cause of 

above


- will get UA/spotProtein to gauge whether there is active renal involvement





# Severe protein calorie malnutrition. Low BMI 17, prox muscle wasting, get 

dietary/ensure





# Dementia. Mild, perseverates w/ poor insight at baseline, monitor for signs 

of acute exacerbation, HS melatonin





Diet. Regular w/ ensure





PPx. High risk, lovenox 40





Code. Full per patient,  MDPOA





Dispo. ADD uncertain, anticipated LOS > 48hrs warranting inpatient admission 

status for reasonable medical necessity including severe anorexia w/ weight loss

, hyponatremia, physical weakness.

## 2017-08-15 LAB
% IMMATURE GRANULYOCYTES: 0.7 % (ref 0–1.1)
ABSOLUTE IMMATURE GRANULOCYTES: 0.11 10^3/UL (ref 0–0.1)
ABSOLUTE NRBC COUNT: 0 10^3/UL (ref 0–0.01)
ADD DIFF?: NO
ADD MORPH?: NO
ADD SCAN?: NO
ALBUMIN SERPL-MCNC: 2.2 G/DL (ref 3.5–5)
ALP SERPL-CCNC: 69 IU/L (ref 38–126)
ALT SERPL-CCNC: 21 IU/L (ref 9–52)
ANION GAP SERPL CALC-SCNC: 9 MEQ/L (ref 8–16)
AST SERPL-CCNC: 10 IU/L (ref 14–46)
ATYPICAL LYMPHOCYTE FLAG: 10 (ref 0–99)
BILIRUB SERPL-MCNC: 0.3 MG/DL (ref 0.1–1.4)
CALCIUM SERPL-MCNC: 7.8 MG/DL (ref 8.5–10.4)
CHLORIDE SERPL-SCNC: 105 MEQ/L (ref 97–110)
CO2 SERPL-SCNC: 20 MEQ/L (ref 22–31)
CREAT SERPL-MCNC: 0.7 MG/DL (ref 0.6–1)
ERYTHROCYTE [DISTWIDTH] IN BLOOD BY AUTOMATED COUNT: 12.5 % (ref 11.5–15.2)
ERYTHROCYTE [SEDIMENTATION RATE] IN BLOOD BY WESTERGREN METHOD: 81 MM/HR (ref 0–30)
FRAGMENT RBC FLAG: 0 (ref 0–99)
GFR SERPL CREATININE-BSD FRML MDRD: > 60 ML/MIN/{1.73_M2}
GLUCOSE SERPL-MCNC: 86 MG/DL (ref 70–100)
HCT VFR BLD CALC: 26.3 % (ref 38–47)
HCT VFR BLD CALC: 31.7 % (ref 38–47)
HGB BLD-MCNC: 10.2 G/DL (ref 12.6–16.3)
HGB BLD-MCNC: 8.7 G/DL (ref 12.6–16.3)
LEFT SHIFT FLG: 30 (ref 0–99)
LIPEMIA HEMOLYSIS FLAG: 80 (ref 0–99)
MCH RBC BLDCO QN: 29.8 PG (ref 27.9–34.1)
MCHC RBC AUTO-ENTMCNC: 33.1 G/DL (ref 32.4–36.7)
MCV RBC AUTO: 90.1 FL (ref 81.5–99.8)
NRBC-AUTO%: 0 % (ref 0–0.2)
PLATELET # BLD: 475 10^3/UL (ref 150–400)
PLATELET CLUMPS FLAG: 0 (ref 0–99)
PMV BLD AUTO: 9.3 FL (ref 8.7–11.7)
POTASSIUM SERPL-SCNC: 4 MEQ/L (ref 3.5–5.2)
PROT SERPL-MCNC: 4.9 G/DL (ref 6.3–8.2)
RBC # BLD AUTO: 2.92 10^6/UL (ref 4.18–5.33)
SODIUM SERPL-SCNC: 134 MEQ/L (ref 134–144)

## 2017-08-15 PROCEDURE — 02HV33Z INSERTION OF INFUSION DEVICE INTO SUPERIOR VENA CAVA, PERCUTANEOUS APPROACH: ICD-10-PCS | Performed by: RADIOLOGY

## 2017-08-15 RX ADMIN — SUCRALFATE SCH GM: 1 TABLET ORAL at 20:48

## 2017-08-15 RX ADMIN — PANTOPRAZOLE SODIUM SCH MG: 40 TABLET, DELAYED RELEASE ORAL at 20:48

## 2017-08-15 RX ADMIN — ENOXAPARIN SODIUM SCH: 100 INJECTION SUBCUTANEOUS at 11:49

## 2017-08-15 RX ADMIN — SUCRALFATE SCH GM: 1 TABLET ORAL at 09:14

## 2017-08-15 RX ADMIN — PANTOPRAZOLE SODIUM SCH MG: 40 TABLET, DELAYED RELEASE ORAL at 09:14

## 2017-08-15 RX ADMIN — SUCRALFATE SCH: 1 TABLET ORAL at 15:56

## 2017-08-15 RX ADMIN — SUCRALFATE SCH: 1 TABLET ORAL at 18:19

## 2017-08-15 RX ADMIN — MEGESTROL ACETATE SCH: 40 TABLET ORAL at 18:19

## 2017-08-15 RX ADMIN — THERA TABS SCH EACH: TAB at 09:14

## 2017-08-15 RX ADMIN — SODIUM CHLORIDE SCH MLS: 900 INJECTION, SOLUTION INTRAVENOUS at 23:23

## 2017-08-15 RX ADMIN — MEGESTROL ACETATE SCH MG: 40 TABLET ORAL at 20:49

## 2017-08-15 NOTE — HOSPPROG
Hospitalist Progress Note


Assessment/Plan: 





89 yo F with PMH of RA, Crohn's disease presenting with approximately 6 months 

of poor PO intake, significant fatigue and overall what sounds like failure to 

thrive





# FTT: patient has had what sounds like a subacute decline as described by her 

 with essentially 6 months of worsening appetite to the point where he 

believes she only eats about 1-200 calories per day, increased fatigue sleeping 

nearly all day and night and generalized weakness. He feels as if it had been 

getting worse since she was discontinued on remicade by her GI doctor 1.5 years 

ago and then got significantly worse after getting PNA 6 months ago. Patient 

states this is because "I'm old" but  feels there are other factors 

contributing. W/u for secondary etiology of FTT including GI eval as next, TSH/

B12 recently checked and are wnl, given RA/Crohn's will check ESR/CRP. Has had 

normal echo recently and no e/o CHF. Will ask for cog eval, pt/ot/slp involved. 


# anorexia: in setting of above, patient states she is minimally able to eat 

but denies pain/dysphagia etc. Query possibility of occult GB disease and will 

get RUQ US to eval for stone (not noted on CT) and HIDA if that is normal. SLP 

involved. Dietary involved. Denies depression


# hyponatremia: 2/2 hypovolemia, resolved


# colitis/crohn's disease: has had hx of this, previously on Remicade and now 

on no medications, followed by Dr. Traylor who is aware of her hospitalization. 

Abd CT personally reviewed with no change from prior, e/o what appears to be 

chronic fibrosis. Consider formal GI consultation if no other etiology found. 


# anemia: mild but with decrease in h/h overnight that was likely lab error, 

repeat h/h back to baseline.  Iron studies consistent with anemia of chronic 

disease in setting of chronic Crohn's and RA


# RA: not on any chronic medication for this, again will check inflammatory 

markers, plt count is elevated


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 Sanchez, not felt to be contributing to her 

subacute decline


# simple ovarian cysts/stable uterine fibroids: noted incidentally on imaging, 

nothing that appears likely to be contributing


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

calorie counts


# IP status, will need > 48 hours stay for eval/mgmt of above


Patient new to my care. Old records reviewed and summarized as above. Care plan 

reviewed at length with  present at bedside, >60 minutes spent in direct 

face to face counseling of patient and her  from [id number] and again 

from 330 to 345


Subjective: patient is somnolent, no significant overnight events, she denies 

pain, when asked when she does not eat she states it is because she is old and 

not going to live forever


Objective: 


 Vital Signs











Temp Pulse Resp BP Pulse Ox


 


 37.0 C   82   18   120/62   95 


 


 08/15/17 15:02  08/15/17 15:02  08/15/17 15:02  08/15/17 15:02  08/15/17 15:02








 Laboratory Results





 08/15/17 14:30 





 08/15/17 05:11 





 











 08/14/17 08/15/17 08/16/17





 05:59 05:59 05:59


 


Intake Total  1000 1036


 


Output Total  440 200


 


Balance  560 836








frail elderly f, somnolent but arousable


anicteric


op clear


rrr no mrg


cta b


soft nt nd


no cce


warm dry well perfused


oriented appropriate, diffusely weak, very somnolent





ICD10 Worksheet


Patient Problems: 


 Problems











Problem Status Onset


 


Failure to thrive Acute  


 


C. difficile diarrhea Acute  07/02/16


 


Chronic Disease Mgmt/Transitional Care Acute  


 


Community acquired pneumonia Acute  


 


Leukocytosis Acute  


 


Syncope Acute

## 2017-08-16 LAB
% IMMATURE GRANULYOCYTES: 0.8 % (ref 0–1.1)
ABSOLUTE IMMATURE GRANULOCYTES: 0.11 10^3/UL (ref 0–0.1)
ABSOLUTE NRBC COUNT: 0 10^3/UL (ref 0–0.01)
ADD DIFF?: NO
ADD MORPH?: NO
ADD SCAN?: NO
ATYPICAL LYMPHOCYTE FLAG: 0 (ref 0–99)
ERYTHROCYTE [DISTWIDTH] IN BLOOD BY AUTOMATED COUNT: 12.7 % (ref 11.5–15.2)
FRAGMENT RBC FLAG: 0 (ref 0–99)
HCT VFR BLD CALC: 26.5 % (ref 38–47)
HGB BLD-MCNC: 8.7 G/DL (ref 12.6–16.3)
LEFT SHIFT FLG: 60 (ref 0–99)
LIPEMIA HEMOLYSIS FLAG: 80 (ref 0–99)
MCH RBC BLDCO QN: 29.8 PG (ref 27.9–34.1)
MCHC RBC AUTO-ENTMCNC: 32.8 G/DL (ref 32.4–36.7)
MCV RBC AUTO: 90.8 FL (ref 81.5–99.8)
NRBC-AUTO%: 0 % (ref 0–0.2)
PLATELET # BLD: 448 10^3/UL (ref 150–400)
PLATELET CLUMPS FLAG: 30 (ref 0–99)
PMV BLD AUTO: 9.1 FL (ref 8.7–11.7)
RBC # BLD AUTO: 2.92 10^6/UL (ref 4.18–5.33)

## 2017-08-16 PROCEDURE — 0DJ08ZZ INSPECTION OF UPPER INTESTINAL TRACT, VIA NATURAL OR ARTIFICIAL OPENING ENDOSCOPIC: ICD-10-PCS | Performed by: INTERNAL MEDICINE

## 2017-08-16 PROCEDURE — 0DB68ZX EXCISION OF STOMACH, VIA NATURAL OR ARTIFICIAL OPENING ENDOSCOPIC, DIAGNOSTIC: ICD-10-PCS | Performed by: INTERNAL MEDICINE

## 2017-08-16 RX ADMIN — THERA TABS SCH: TAB at 10:18

## 2017-08-16 RX ADMIN — MEGESTROL ACETATE SCH: 40 TABLET ORAL at 16:29

## 2017-08-16 RX ADMIN — MEGESTROL ACETATE SCH MG: 40 TABLET ORAL at 20:56

## 2017-08-16 RX ADMIN — DRONABINOL SCH MG: 2.5 CAPSULE ORAL at 20:56

## 2017-08-16 RX ADMIN — SUCRALFATE SCH GM: 1 TABLET ORAL at 18:38

## 2017-08-16 RX ADMIN — PANTOPRAZOLE SODIUM SCH MG: 40 TABLET, DELAYED RELEASE ORAL at 20:57

## 2017-08-16 RX ADMIN — ENOXAPARIN SODIUM SCH MG: 100 INJECTION SUBCUTANEOUS at 10:10

## 2017-08-16 RX ADMIN — SUCRALFATE SCH GM: 1 TABLET ORAL at 16:15

## 2017-08-16 RX ADMIN — MEGESTROL ACETATE SCH MG: 40 TABLET ORAL at 05:01

## 2017-08-16 RX ADMIN — SUCRALFATE SCH GM: 1 TABLET ORAL at 07:53

## 2017-08-16 RX ADMIN — MEGESTROL ACETATE SCH MG: 40 TABLET ORAL at 16:15

## 2017-08-16 RX ADMIN — SUCRALFATE SCH GM: 1 TABLET ORAL at 20:56

## 2017-08-16 RX ADMIN — PANTOPRAZOLE SODIUM SCH MG: 40 TABLET, DELAYED RELEASE ORAL at 07:54

## 2017-08-16 RX ADMIN — SODIUM CHLORIDE SCH MLS: 900 INJECTION, SOLUTION INTRAVENOUS at 10:10

## 2017-08-16 NOTE — GPN
[f 
rep st]



                                                                 PROCEDURE NOTE





INDICATIONS:  This patient is an 88-year-old female, admitted with failure to 
thrive and anorexia, although she claims to have a good appetite.  The patient 
carries a diagnosis of Crohn's ileocolitis and has been off therapy for the 
past year, has not had any significant symptoms related to it.  Ultrasound is 
negative.  HIDA scan is negative.  Endoscopy is being requested to rule out 
upper GI causes for her symptoms.



DESCRIPTION OF PROCEDURE:  After proper consent was obtained, patient placed in 
left lateral decubitus position and given IV general anesthesia.  Her ASA class 
was 3. 



Video gastroscope was introduced through the mouth, down the esophagus, in the 
stomach, past the pylorus, into the duodenum.



FINDINGS:  

1.  Esophagus is normal.

2.  A 2 cm hiatal hernia is noted.

3.  Stomach has minimal gastritis pattern noted.  Biopsies were taken to rule 
out H pylori.

4.  Duodenum is normal.

5.  No evidence of any obstruction is noted. 





At this point, instrument was removed.  Patient tolerated procedure well.  She 
was transferred to the recovery room in stable condition.



RECOMMENDATIONS:  The patient will continue on therapy for peptic ulcer 
disease.  If H pylori is present, I will treat accordingly.  At this point, I 
see no reason to workup the remainder of the GI tract. We should also consider 
an appetite stimulant.





Job #:  [id number]

MTDD

## 2017-08-16 NOTE — PDANEPAE
ANE History of Present Illness





88 YEAR OLD female with anemia and recent weight loss/failure to thrive.





ANE Past Medical History


Past Medical History: 





No URI/fever x2 weeks.





- Pulmonary History


Hx Oxygen in Use at Home: No


Hx Sleep Apnea: No


Sleep Apnea Screening Result - Last Documented: Negative





- Endocrine History


Hx Diabetes: No





- Neurological & Psychiatric Hx


Hx Neurological and Psychiatric Disorders: Yes


Neurological / Psychiatric History Comment: dementia





- GI History


Hx Gastrointestinal Disorders: Yes


Gastrointestinal History Comment: possible Crohn's disease; recent anorexia





- Chronic Pain History


Chronic Pain: No





ANE Review of Systems





- Systems


Constitutional: Reports: malaise, weakness





ANE Patient History





- Allergies


Allergies/Adverse Reactions: 








No Known Allergies Allergy (Verified 08/14/17 12:48)


 








- Home Medications


Home medications: home medication list seen and reviewed


Home Medications: 








Bifidobacterium Infantis [Align] 4 mg PO DAILY 08/14/17 [Last Taken 08/13/17]


Multivitamins [Multivitamin (*)] 1 each PO DAILY 08/14/17 [Last Taken 08/13/17]








- NPO status


NPO Since - Liquids (Date): 08/15/17


NPO Since - Liquids (Time): 19:00


NPO Since - Solids (Date): 08/15/17


NPO Since - Solids (Time): 19:00





- Anes Hx


Anes Hx: no prior problems





- Smoking Hx


Smoking Status: Never smoked





- Alcohol Use


Alcohol Use: None





- Family Anes Hx


Family Anes Hx: none





ANE Labs/Vital Signs





- Labs


Result Diagrams: 


 08/16/17 05:00





 08/15/17 05:11





- Vital Signs


Blood Pressure: 110/48


Heart Rate: 84


Respiratory Rate: 16


O2 Sat (%): 95


Height: 152.4 cm


Weight: 42.6 kg





ANE Physical Exam





- Airway


Neck exam: FROM


Mallampati Score: Class 3


Mouth exam: normal dental/mouth exam





- Pulmonary


Pulmonary: clear to auscultation (distant breath sounds)





- Cardiovascular


Cardiovascular: regular rate and rhythym





- ASA Status


ASA Status: III





ANE Anesthesia Plan


Total IV Anesthesia: TIVA

## 2017-08-16 NOTE — POSTOPPROG
Post Op Note


Date of Operation: 08/16/17


Surgeon: Darrell Traylor


Anesthesia: IV Sedation


Pre-op Diagnosis: anorexia


Post-op Diagnosis: mild gastritis, hiatal hernia


Procedure: EGD/biopsies


Inf/Abcess present in the surg proc area at time of surgery?: No


Specimen(s): 





Deanna

## 2017-08-16 NOTE — HOSPPROG
Hospitalist Progress Note


Assessment/Plan: 





89 yo F with PMH of RA, Crohn's disease presenting with approximately 6 months 

of poor PO intake, significant fatigue and overall what sounds like failure to 

thrive





# FTT: patient has had what sounds like a subacute decline as described by her 

 with essentially 6 months of worsening appetite to the point where he 

believes she only eats about 1-200 calories per day, increased fatigue sleeping 

nearly all day and night and generalized weakness. W/u thus far unrevealing for 

primary etiology, patient herself believes it is due to "getting old" and that 

she is "not going to live forever" but  hoping for a cure. TSH, B12, 

cortisol all wnl. W/u for anorexia as next. Discussed trial or ritalin to try 

to help with sleepiness which has been profound. No concerns for depression or 

dementia per  and patient. PT/OT/SLP/cog eval. Will ask palliative care 

to get involved. 


# anorexia: in setting of above, no organic etiology found thus far other than e

/o gastritis and hiatal hernia--treating for possible PUD. Dietary and SLP 

involved. Started on megace. Given sleepiness reluctant to try remeron, could 

consider marinol.  


# hyponatremia: 2/2 hypovolemia, resolved


# colitis/crohn's disease: has had hx of this, previously on Remicade and now 

on no medications, followed by Dr. Traylor who is aware of her hospitalization. 

Abd CT personally reviewed with no change from prior, e/o what appears to be 

chronic fibrosis. Chronically elevated inflammatory markers. Reviewed with GI 

who does not feel that at this time colonoscopy would be very helpful. Does not 

have significant diarrhea concerning for flare. Could consider steroids but in 

discussion with GI this is unlikely to be helpful. Could consider if her sxs do 

not improve. 


# anemia: mild but with decrease in h/h overnight that was likely lab error, 

repeat h/h back to baseline.  Iron studies consistent with anemia of chronic 

disease in setting of chronic Crohn's and RA


# RA: not on any chronic medication for this, again will check inflammatory 

markers, plt count is elevated


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 Sanchez, not felt to be contributing to her 

subacute decline


# simple ovarian cysts/stable uterine fibroids: noted incidentally on imaging, 

nothing that appears likely to be contributing


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

calorie counts


# IP status, will need > 48 hours stay for eval/mgmt of above


Care plan reviewed with Dr. Traylor including plan for continued tx of presumed 

PUD. 


Subjective: no significant overnight events, still very sleepy, no issues with 

procedures performed today


Objective: 


 Vital Signs











Temp Pulse Resp BP Pulse Ox


 


 36.8 C   84   18   118/57 L  98 


 


 08/16/17 14:44  08/16/17 14:21  08/16/17 15:16  08/16/17 15:16  08/16/17 15:25








 Laboratory Results





 08/16/17 05:00 





 08/15/17 05:11 





 











 08/15/17 08/16/17 08/17/17





 05:59 05:59 05:59


 


Intake Total 1000 1036 350


 


Output Total 440 1700 0


 


Balance 560 -664 350








frail elderly f, somnolent but arousable


anicteric


op clear


rrr no mrg


cta b


soft nt nd


no cce


warm dry well perfused


oriented appropriate, diffusely weak, very somnolent





- Time Spent With Patient


Time Spent with Patient: greater than 35 minutes


Time Spent with Patient: Greater than 35 minutes spent on this patients care, 

greater than 50% of time spent counseling, educating, and coordinating care 

regarding the above mentioned plan.





ICD10 Worksheet


Patient Problems: 


 Problems











Problem Status Onset


 


Failure to thrive Acute  


 


Generalized weakness Acute  


 


C. difficile diarrhea Acute  07/02/16


 


Chronic Disease Mgmt/Transitional Care Acute  


 


Community acquired pneumonia Acute  


 


Leukocytosis Acute  


 


Syncope Acute

## 2017-08-17 VITALS
DIASTOLIC BLOOD PRESSURE: 54 MMHG | TEMPERATURE: 97.3 F | OXYGEN SATURATION: 93 % | SYSTOLIC BLOOD PRESSURE: 105 MMHG | HEART RATE: 80 BPM | RESPIRATION RATE: 18 BRPM

## 2017-08-17 LAB
ABSOLUTE NRBC COUNT: 0 10^3/UL (ref 0–0.01)
ADD DIFF?: YES
ADD MORPH?: NO
ADD SCAN?: YES
ATYPICAL LYMPHOCYTE FLAG: 0 (ref 0–99)
ERYTHROCYTE [DISTWIDTH] IN BLOOD BY AUTOMATED COUNT: 12.7 % (ref 11.5–15.2)
FRAGMENT RBC FLAG: 0 (ref 0–99)
HCT VFR BLD CALC: 27.9 % (ref 38–47)
HGB BLD-MCNC: 9.3 G/DL (ref 12.6–16.3)
LEFT SHIFT FLG: 130 (ref 0–99)
LIPEMIA HEMOLYSIS FLAG: 80 (ref 0–99)
MCH RBC BLDCO QN: 29.6 PG (ref 27.9–34.1)
MCHC RBC AUTO-ENTMCNC: 33.3 G/DL (ref 32.4–36.7)
MCV RBC AUTO: 88.9 FL (ref 81.5–99.8)
NRBC-AUTO%: 0 % (ref 0–0.2)
PLATELET # BLD EST: ADEQUATE 10*3/UL
PLATELET # BLD: 438 10^3/UL (ref 150–400)
PLATELET CLUMPS FLAG: 0 (ref 0–99)
PMV BLD AUTO: 9.4 FL (ref 8.7–11.7)
RBC # BLD AUTO: 3.14 10^6/UL (ref 4.18–5.33)
SCAN: POSITIVE
TOXIC GRANULES BLD QL SMEAR: PRESENT

## 2017-08-17 RX ADMIN — ENOXAPARIN SODIUM SCH MG: 100 INJECTION SUBCUTANEOUS at 11:11

## 2017-08-17 RX ADMIN — PANTOPRAZOLE SODIUM SCH MG: 40 TABLET, DELAYED RELEASE ORAL at 10:05

## 2017-08-17 RX ADMIN — SUCRALFATE SCH GM: 1 TABLET ORAL at 10:04

## 2017-08-17 RX ADMIN — DRONABINOL SCH MG: 2.5 CAPSULE ORAL at 10:04

## 2017-08-17 RX ADMIN — THERA TABS SCH EACH: TAB at 10:05

## 2017-08-17 RX ADMIN — MEGESTROL ACETATE SCH MG: 40 TABLET ORAL at 05:12

## 2017-08-17 RX ADMIN — MEGESTROL ACETATE SCH MG: 40 TABLET ORAL at 15:51

## 2017-08-17 RX ADMIN — SODIUM CHLORIDE SCH MLS: 900 INJECTION, SOLUTION INTRAVENOUS at 03:23

## 2017-08-17 RX ADMIN — SUCRALFATE SCH GM: 1 TABLET ORAL at 15:51

## 2017-08-17 NOTE — GDS
[f rep st]



                                                             DISCHARGE SUMMARY





DISCHARGE DIAGNOSES:  

1.  Failure to thrive with weight loss.  

2.  Chronically elevated inflammatory markers.  

3.  Anorexia with food avoidance.  

4.  History of Crohn disease, noted to be atypical Crohn's.

5.  Anemia, presumed of chronic disease.



PROCEDURES THIS ADMISSION:  EGD which showed hiatal hernia and mild gastritis.  Please see admission
 history and physical by Dr. Praveen Garrett.  The patient presented with failure to thrive and poor p.o
. intake, getting about 100 calories in a day.  She was not particularly volume depleted.  She had a
lbumin that has fallen 3 a month ago and 4 at the beginning of the year to 2.2.  She does not have e
thais.  She does not have liver disease.  She had an abdominal CT which is relatively unremarkable an
d certainly had no smoking gun.  She had an EGD which was also unremarkable.  Additionally, she had 
an abdominal ultrasound and nuclear scan which were with no evidence of hepatobiliary disease. 



There was a palliative care consult which transitioned her to DNR.  She was started empirically on p
rednisone, Ritalin, Marinol, and Megace.  She is discharged home with outpatient followup.





Job #:  [id number]

## 2017-08-17 NOTE — HOSPPROG
Hospitalist Progress Note


Assessment/Plan: 





87 yo F with PMH of RA, Crohn's disease presenting with approximately 6 months 

of poor PO intake, significant fatigue and overall what sounds like failure to 

thrive





FTT: patient has had what sounds like a subacute decline as described by her 

 with essentially 6 months of worsening appetite to the point where he 

believes she only eats about 1-200 calories per day, increased fatigue sleeping 

nearly all day and night and generalized weakness. W/u thus far unrevealing for 

primary etiology, patient herself believes it is due to "getting old" and that 

she is "not going to live forever" but  hoping for a cure. TSH, B12, 

cortisol all wnl. W/u for anorexia as next. Discussed trial or ritalin to try 

to help with sleepiness which has been profound. No concerns for depression or 

dementia per  and patient. PT/OT/SLP/cog eval. Will ask palliative care 

to get involved. 


   trial of ritalin/marinol/prednisone/megace


   egd and imaging unremarkable for 





anorexia: in setting of above, no organic etiology found thus far other than e/

o gastritis and hiatal hernia--treating for possible PUD. Dietary and SLP 

involved. Started on megace.


   see above





hyponatremia: 2/2 hypovolemia, resolved





colitis/crohn's disease: has had hx of this, previously on Remicade and now on 

no medications, followed by Dr. Traylor who is aware of her hospitalization. Abd 

CT personally reviewed with no change from prior, e/o what appears to be 

chronic fibrosis. Chronically elevated inflammatory markers. Reviewed with GI 

who does not feel that at this time colonoscopy would be very helpful. Does not 

have significant diarrhea concerning for flare. see above





anemia: mild but with decrease in h/h overnight that was likely lab error, 

repeat h/h back to baseline.  Iron studies consistent with anemia of chronic 

disease in setting of chronic Crohn's and RA





RA: not on any chronic medication for this, again will check inflammatory 

markers, plt count is elevated





MGUS: this has been followed by Dr. Sanchez, not felt to be contributing to her 

subacute decline





simple ovarian cysts/stable uterine fibroids: noted incidentally on imaging, 

nothing that appears likely to be contributing





SPCM: with BMI of 18, very poor po intake, asking for dietary to get involved/

calorie counts





IP status, will need > 48 hours stay for eval/mgmt of above


see above


   home today


   > 30 minutes


Subjective: long discussion re: plan of care.  home today


Objective: 


 Vital Signs











Temp Pulse Resp BP Pulse Ox


 


 36.6 C   79   16   111/64   97 


 


 08/17/17 07:59  08/17/17 07:59  08/17/17 07:59  08/17/17 07:59  08/17/17 07:59








 Laboratory Results





 08/17/17 05:19 





 08/15/17 05:11 





 











 08/16/17 08/17/17 08/18/17





 05:59 05:59 05:59


 


Intake Total 1036 1200 375


 


Output Total 1700 400 250


 


Balance -664 800 125














- Physical Exam


Constitutional: no apparent distress, appears nourished


Eyes: PERRL, anicteric sclera


Ears, Nose, Mouth, Throat: moist mucous membranes, hearing normal


Cardiovascular: regular rate and rhythym, no murmur, rub, or gallop


Respiratory: no respiratory distress, no rales or rhonchi


Gastrointestinal: normoactive bowel sounds, soft, non-tender abdomen


Genitourinary: no bladder fullness, No pandya in urethra


Skin: warm, normal color


Musculoskeletal: full muscle strength, no muscle tenderness


Neurologic: AAOx3, sensation intact bilaterally


Psychiatric: interacting appropriately, not anxious


Lymph, Heme, Immunologic: no cervical LAD, no supraclavicular LAD





ICD10 Worksheet


Patient Problems: 


 Problems











Problem Status Onset


 


Failure to thrive Acute  


 


Generalized weakness Acute  


 


C. difficile diarrhea Acute  07/02/16


 


Chronic Disease Mgmt/Transitional Care Acute  


 


Community acquired pneumonia Acute  


 


Leukocytosis Acute  


 


Syncope Acute

## 2017-08-17 NOTE — PDIAF
- Diagnosis


Diagnosis: FTT


Code Status: Do Not Resuscitate





- Medication Management


Discharge Medications: 


 Medications to Continue on Transfer





Bifidobacterium Infantis [Align] 4 mg PO DAILY 08/14/17 [Last Taken 08/13/17]


Multivitamins [Multivitamin (*)] 1 each PO DAILY 08/14/17 [Last Taken 08/13/17]


Dronabinol [Marinol 2.5 MG (*)] 2.5 mg PO BID #60 cap 08/17/17 [Last Taken 

Unknown]


Megestrol Acetate [Megace 40 mg (*)] 40 mg PO QID #120 tab 08/17/17 [Last Taken 

Unknown]


Methylphenidate HCl [Ritalin 5mg (*)] 5 mg PO DAILY #30 tab 08/17/17 [Last 

Taken Unknown]


Pantoprazole Sodium [Protonix 40mg (*)] 40 mg PO BID #60 tab 08/17/17 [Last 

Taken Unknown]


Sucralfate [Carafate 1 GM (*)] 1 gm PO ACHS #120 tab 08/17/17 [Last Taken 

Unknown]


predniSONE 40 mg PO DAILY #30 tablet 08/17/17 [Last Taken Unknown]








Discharge Medications: Refer to the Discharge Home Medication list for PRN 

reason.





- Orders


Services needed: Home Care, Registered Nurse


Home Care Face to Face: I certify that this patient was under my care and that 

I had the required face-to-face encounter meeting the encounter requirements on 

the discharge day.  My findings support the fact that the patient is homebound 

as defined in CMS Chapter 7 Medicare Benefits Manual 30.1.1, The condition of 

the patient is such that there exists a normal inability to leave home and 

consequently, leaving home would require a considerable and taxing effort.


Diet Texture: Regular Texture Diet, Thin Liquids, Meds Whole w/Liquids





- Follow Up Care


Current Providers and Referrals: 


Kimberlyn Armenta MD [Primary Care Provider] - As per Instructions

## 2017-08-17 NOTE — PDIAF
- Diagnosis


Diagnosis: FTT


Code Status: Do Not Resuscitate





- Medication Management


Discharge Medications: 


 Medications to Continue on Transfer





Bifidobacterium Infantis [Align] 4 mg PO DAILY 08/14/17 [Last Taken 08/13/17]


Multivitamins [Multivitamin (*)] 1 each PO DAILY 08/14/17 [Last Taken 08/13/17]


Dronabinol [Marinol 2.5 MG (*)] 2.5 mg PO BID #60 cap 08/17/17 [Last Taken 

Unknown]


Megestrol Acetate [Megace 40 mg (*)] 40 mg PO QID #120 tab 08/17/17 [Last Taken 

Unknown]


Methylphenidate HCl [Ritalin 5mg (*)] 5 mg PO DAILY #30 tab 08/17/17 [Last 

Taken Unknown]


Pantoprazole Sodium [Protonix 40mg (*)] 40 mg PO BID #60 tab 08/17/17 [Last 

Taken Unknown]


Sucralfate [Carafate 1 GM (*)] 1 gm PO ACHS #120 tab 08/17/17 [Last Taken 

Unknown]


predniSONE 40 mg PO DAILY #30 tablet 08/17/17 [Last Taken Unknown]








Discharge Medications: Refer to the Discharge Home Medication list for PRN 

reason.





- Orders


Services needed: Home Care


Home Care Face to Face: I certify that this patient was under my care and that 

I had the required face-to-face encounter meeting the encounter requirements on 

the discharge day.  My findings support the fact that the patient is homebound 

as defined in CMS Chapter 7 Medicare Benefits Manual 30.1.1, The condition of 

the patient is such that there exists a normal inability to leave home and 

consequently, leaving home would require a considerable and taxing effort.


Diet Texture: Regular Texture Diet, Thin Liquids, Meds Whole w/Liquids





- Follow Up Care


Current Providers and Referrals: 


Kimberlyn Armenta MD [Primary Care Provider] - As per Instructions

## 2017-08-17 NOTE — PDPCPN
Palliative Care Progress Note


Assessment/Plan: 


Referring provider: Dr Zazueta





Reason for consult:


Complex medical decision making


Symptom control





HPI:


Marilyn Wertheimer is a 89 yo female with PMH Crohn's disease (Remicade stopped 

1 year ago), RA, PNA, and mild dementia admitted to the hospital for anorexia, 

fatigue, and poor appetite. Medical work up thus far negative for reversible 

causes. s/p EGD with mild gastritis and hiatal hernia. Calorie count in 

progress and started on prednisone for possible Crohn's flair. Also added 

multiple appetite stimulants to help. Palliative care consulted for complex 

medical decision making. 





Met with  Mika at the bedside this morning. He stated Shikha was 

much improved today being more awake and wanting to eat some more. He said he 

has been speaking with family and the doctors about what could be wrong with 

Shikha and understands she may or may not improve. He is hoping with the new 

medications her FTT will improve and she will begin to eat again. We spoke 

about if she continues to decline what might be the goal and he stated then 

they might consider hospice care. Also discussed code status with being very 

clear they would not want medical interventions if there was a very poor 

outcome. Discussed having outpatient palliative care follow for continued 

symptom management of poor appetite and fatigue and was agreeable. 





Assessment:


Physical:


- Pain: none noted


   -tylenol PRN





- Poor appetite:


   - started on megace, prednisone, and ritalin


   - marinol can sometimes cause some sleepiness so monitor. 





- Weakness


   - nursing support


   - PT/OT as able


   - getting prednisone and ritalin as above 





Emotional/psychological: Doing ok. Has a lot of support from  





Advanced Care Planning:


   Is patient decisional?: Yes with help


   Code Status: DNR- confirmed today they both would not want medical 

interventions if the outcome was expected to be poor


   MD POA:  Mika is MDPOA. 





Plan: Return home when medically ready. Will have outpt Dev[facility] palliative care 

follow up for support at home.  understands this FTT may not reverse and 

could result in Shikha being near end of life. 








Subjective: 


I'm not that hungry





Objective: 


Social History:  to Mika. Lives at the [place]. Retired [employer] 

professor. Enjoys work out classes and being social. 





Medication list reviewed 





ROS: 


General: fatigue, weakness, weight loss


ENT: negative


Resp: negative


GI: poor appetite


: negative


MS: negative


Skin: negative


Neuro: negative


Psych: some confusion





Functional assessment:


   PPS: 50%


   Functional status: needs some assistance with ADLs





 Vital Signs











Temp Pulse Resp BP Pulse Ox


 


 36.3 C   80   18   105/54 L  93 


 


 08/17/17 12:00  08/17/17 12:00  08/17/17 12:00  08/17/17 12:00  08/17/17 12:00








 Laboratory Results





 08/17/17 05:19 





 08/15/17 05:11 





 











 08/16/17 08/17/17 08/18/17





 05:59 05:59 05:59


 


Intake Total 1036 1200 375


 


Output Total 1700 400 250


 


Balance -664 800 125














Physical Exam





- Physical Exam


General Appearance: alert, no apparent distress


Respiratory: No respiratory distress, No accessory muscle use


Skin: normal color, warm/dry


Extremities: No pedal edema


Neuro/Psych: alert, oriented x 3





ICD10 Worksheet


Patient Problems: 


 Problems











Problem Status Onset


 


Failure to thrive Acute  


 


Generalized weakness Acute  


 


Palliative care encounter Acute  


 


C. difficile diarrhea Acute  07/02/16


 


Chronic Disease Mgmt/Transitional Care Acute  


 


Community acquired pneumonia Acute  


 


Leukocytosis Acute  


 


Syncope Acute  














- ICD10 Problem Qualifiers


(1) Palliative care encounter

## 2018-03-25 ENCOUNTER — HOSPITAL ENCOUNTER (EMERGENCY)
Dept: HOSPITAL 80 - FED | Age: 83
Discharge: HOME | End: 2018-03-25
Payer: COMMERCIAL

## 2018-03-25 VITALS — RESPIRATION RATE: 16 BRPM

## 2018-03-25 VITALS
DIASTOLIC BLOOD PRESSURE: 76 MMHG | TEMPERATURE: 98.2 F | SYSTOLIC BLOOD PRESSURE: 137 MMHG | HEART RATE: 73 BPM | OXYGEN SATURATION: 96 %

## 2018-03-25 DIAGNOSIS — J18.9: Primary | ICD-10-CM

## 2018-03-25 LAB — PLATELET # BLD: 341 10^3/UL (ref 150–400)

## 2018-03-25 PROCEDURE — 96365 THER/PROPH/DIAG IV INF INIT: CPT

## 2018-03-25 PROCEDURE — 71046 X-RAY EXAM CHEST 2 VIEWS: CPT

## 2018-03-25 PROCEDURE — 99284 EMERGENCY DEPT VISIT MOD MDM: CPT

## 2018-03-25 NOTE — EDPHY
H & P


Source: Patient


Exam Limitations: No limitations





- Medical/Surgical History


Hx Asthma: No


Hx Chronic Respiratory Disease: No


Hx Diabetes: No


Hx Cardiac Disease: No


Hx Renal Disease: No


Hx Cirrhosis: No


Hx Alcoholism: No


Hx HIV/AIDS: No


Hx Splenectomy or Spleen Trauma: No


Other PMH: Crohns w/remicaide infusions, PNA; c-diff





- Social History


Smoking Status: Never smoked


Time Seen by Provider: 03/25/18 16:44


HPI/ROS: 


HPI:  This is an 89-year-old female who presents with





Chief Complaint:  Nausea, elevated blood pressure





Location:  GI


Quality:  Nausea


Duration:  3-5 hours prior to arrival


Signs and Symptoms: no fever, + nausea, no vomiting, no hematemesis, no blood 

in stool, no abdominal bloating, no diarrhea, no back pain, no urinary symptoms

, no vaginal bleeding, no indigestion, no chest pain, no shortness of breath, 

no chills


Timing:  Acute, resolved


Severity:  Mild


Context:  History of Crohn's with Remicade infusions and C diff colitis 

presents with 1 episode of nausea early this morning that self resolved. Denies 

any blood in stool/abdominal pain/vomiting/diarrhea/fever/urinary symptoms.  

[facility] and was noted to have an elevated 

blood pressure.  They recommended that she go to the emergency room to be 

further evaluated.  Patient currently has no complaints and is asking to eat 

some food as she is extremely hungry.   reports that she has been at her 

baseline other than he has noted intermittent nonproductive cough that is 

unproductive in nature and worse at night over the last 2 weeks.  While at the 

[facility], they performed a urinalysis and per patient it was normal. 


Modifying Factors:  None





Comment: 








ROS: see HPI


Constitutional: No fever, no chills, no weight loss


Eyes:  No blurred vision


Respiratory:  No shortness of breath, + cough


Cardiovascular:  No chest pain, no palpitations


Gastrointestinal:  No nausea, no vomiting, no diarrhea, no hematemesis, no 

blood in stool 


Genitourinary:  No dysuria, no blood in urine 


Extremities:  No myalgias, no edema


Neurologic:  No weakness, no numbness


Skin:  No rashes, no petechiae


Hematologic:  No bruising, no bleeding








MEDICAL/SURGICAL/SOCIAL HISTORY: 


Medical history:  Crohns w/Remicade infusions, PNA; c-diff


Surgical history:  Denies


Social history:  .  Retired. 








CONSTITUTIONAL:  Petite, talkative, cooperative elderly white female,  

at bedside, awake and alert, no obvious distress


HEENT: Atraumatic and normocephalic, PERRL, EOMI. Tympanic membranes clear. 

Oropharynx clear, no exudate and moist pink mucosa.  Airway patent.  No 

lymphadenopathy.  No meningismus.


Cardiovascular: Normal S1/S2, regular rate, regular rhythm, without murmur rub 

or gallop.


PULMONARY/CHEST:  Symmetrical and nontender. Clear to auscultation bilaterally. 

Good air movement. No accessory muscle usage.


ABDOMEN:  Soft, nondistended, nontender, no rebound, no guarding, no peritoneal 

signs, no masses or organomegaly. No CVAT.


EXTREMITIES:  2/2 pulses, strength 5/5, no deformities, no clubbing, no 

cyanosis or edema.


NEUROLOGICAL: no focal neuro deficits.  GCS 15.


SKIN: Warm and dry, pallor, no erythema. no rash.  Good capillary refill. 


  


 (Dory Tapia)


Constitutional: 





 Initial Vital Signs











Temperature (C)  99.7 F   03/25/18 16:45


 


Heart Rate  78   03/25/18 16:45


 


Respiratory Rate  18   03/25/18 16:45


 


Blood Pressure  151/90 H  03/25/18 16:45


 


O2 Sat (%)  93   03/25/18 16:45








 











O2 Delivery Mode               Room Air














Allergies/Adverse Reactions: 


 





No Known Allergies Allergy (Verified 08/14/17 12:48)


 








Home Medications: 














 Medication  Instructions  Recorded


 


levOFLOXACIN [levAQUIN (*)] 500 mg PO DAILY #7 tab 03/25/18














Medical Decision Making





- Diagnostics


Imaging Results: 





 Imaging Impressions





Chest X-Ray  03/25/18 16:55


Impression: Mild lingular infiltrate. 


 


Clinical correlation and follow-up to assure resolution are recommended.











ED Course/Re-evaluation: 


Patient politely declines repeat urinalysis.  She is agreeable to labs and 

chest x-ray. 


Blood pressure upon arrival 151/90


No signs of CVA/sepsis/dehydration/anemia


Chest x-ray shows mild left-sided lingular infiltrate


labs reviewed: minimal leukocytosis 


No history of lung disease.  No hypoxia/respiratory distress.  


Offered admission to patient and  who politely declined and wished to be 

managed outpatient.  


Given IV Levaquin and prescription for same.  


They are to follow up with her primary care provider this week. 





1815: End of Shift. Signed over to MED Brown pending results of remaining labs. 











This patient was seen under the supervision of my secondary supervising 

physician.  I evaluated care for this patient independently.   (Dory Tapia)


Differential Diagnosis: 


Weakness including but not limited to electrolyte abnormality, depression, 

anxiety, CVA, spinal cord abnormality, and infectious causes.


 (Dory Tapia)


Other Provider: 





6:20 p.m.


I discussed the case at length with Dory Tapia PA-C.  I was asked to monitor 

the return of the patient's laboratory studies, specifically her chemistry.  

Patient has been diagnosed with pneumonia and is stable for discharge home per 

MED Tapia.  She has treatment plan in place.  She has discussed this with the 

patient and the only thing left was her chemistry.  This has returned but no 

significant electrolyte abnormalities.  There is a mildly elevated glucose of 

136. I discussed this with the patient's with that she may follow up with her 

primary care physician about this.  At this point she is stable for discharge 

home with prescriptions provided by MED Tapia.    (Louis Brown)





- Data Points


Laboratory Results: 





 Laboratory Results





 03/25/18 17:45 





 03/25/18 17:45 





 











  03/25/18 03/25/18 03/25/18





  17:45 17:45 17:45


 


WBC  10.18 10^3/uL H 10^3/uL    





   (3.80-9.50)   


 


RBC  4.51 10^6/uL 10^6/uL    





   (4.18-5.33)   


 


Hgb  13.0 g/dL g/dL    





   (12.6-16.3)   


 


Hct  39.3 % %    





   (38.0-47.0)   


 


MCV  87.1 fL fL    





   (81.5-99.8)   


 


MCH  28.8 pg pg    





   (27.9-34.1)   


 


MCHC  33.1 g/dL g/dL    





   (32.4-36.7)   


 


RDW  17.0 % H %    





   (11.5-15.2)   


 


Plt Count  341 10^3/uL 10^3/uL    





   (150-400)   


 


MPV  10.3 fL fL    





   (8.7-11.7)   


 


Neut % (Auto)  89.3 % H %    





   (39.3-74.2)   


 


Lymph % (Auto)  7.0 % L %    





   (15.0-45.0)   


 


Mono % (Auto)  3.1 % L %    





   (4.5-13.0)   


 


Eos % (Auto)  0.0 % L %    





   (0.6-7.6)   


 


Baso % (Auto)  0.2 % L %    





   (0.3-1.7)   


 


Nucleat RBC Rel Count  0.0 % %    





   (0.0-0.2)   


 


Absolute Neuts (auto)  9.09 10^3/uL H 10^3/uL    





   (1.70-6.50)   


 


Absolute Lymphs (auto)  0.71 10^3/uL L 10^3/uL    





   (1.00-3.00)   


 


Absolute Monos (auto)  0.32 10^3/uL 10^3/uL    





   (0.30-0.80)   


 


Absolute Eos (auto)  0.00 10^3/uL L 10^3/uL    





   (0.03-0.40)   


 


Absolute Basos (auto)  0.02 10^3/uL 10^3/uL    





   (0.02-0.10)   


 


Absolute Nucleated RBC  0.00 10^3/uL 10^3/uL    





   (0-0.01)   


 


Immature Gran %  0.4 % %    





   (0.0-1.1)   


 


Immature Gran #  0.04 10^3/uL 10^3/uL    





   (0.00-0.10)   


 


VBG Lactic Acid    1.1 mmol/L mmol/L  





    (0.7-2.1)  


 


Sodium      136 mEq/L mEq/L





     (135-145) 


 


Potassium      4.3 mEq/L mEq/L





     (3.5-5.2) 


 


Chloride      98 mEq/L mEq/L





     () 


 


Carbon Dioxide      24 mEq/l mEq/l





     (22-31) 


 


Anion Gap      14 mEq/L mEq/L





     (8-16) 


 


BUN      13 mg/dL mg/dL





     (7-23) 


 


Creatinine      0.6 mg/dL mg/dL





     (0.6-1.0) 


 


Estimated GFR      > 60 





    


 


Glucose      136 mg/dL H mg/dL





     () 


 


Calcium      9.1 mg/dL mg/dL





     (8.5-10.4) 


 


Total Bilirubin      0.5 mg/dL mg/dL





     (0.1-1.4) 


 


Conjugated Bilirubin      0.3 mg/dL mg/dL





     (0.0-0.5) 


 


Unconjugated Bilirubin      0.2 mg/dL mg/dL





     (0.0-1.1) 


 


AST      26 IU/L IU/L





     (14-46) 


 


ALT      32 IU/L IU/L





     (9-52) 


 


Alkaline Phosphatase      73 IU/L IU/L





     () 


 


Total Protein      8.1 g/dL g/dL





     (6.3-8.2) 


 


Albumin      4.3 g/dL g/dL





     (3.5-5.0) 











Medications Given: 





 





Levofloxacin/Dextrose (Levaquin 750 Mg (Premix))  150 mls @ 100 mls/hr IV EDNOW 

ONE


   PRN Reason: Protocol


   Stop: 03/25/18 19:15


   Last Admin: 03/25/18 18:08 Dose:  150 mls








Departure





- Departure


Disposition: Home, Routine, Self-Care


Clinical Impression: 


Community acquired pneumonia


Qualifiers:


 Laterality: left Lung location: unspecified part of lung Qualified Code(s): 

J18.9 - Pneumonia, unspecified organism





Condition: Good


Instructions:  Community Acquired Pneumonia (ED)


Additional Instructions: 


Take Tylenol 650 mg every 4 hours and/or Ibuprofen 600 mg every 8 hours with 

food as needed for pain/fever. 


Take Levaquin 500 mg daily x 7 days. 


Please follow-up with your primary care provider this week.  


Please wash your hands frequently, cover your cough, and stay home until you 

are symptom free.





Return to the ER immediately if you experience fevers/chills, shortness of 

breath, abdominal pain, inability to tolerate oral intake, or any other 

symptoms that concern you. 

















Referrals: 


PCP Not In,Dictionary [Medical Doctor] - 2-3 days without fail


Prescriptions: 


levOFLOXACIN [levAQUIN (*)] 500 mg PO DAILY #7 tab

## 2018-10-31 ENCOUNTER — HOSPITAL ENCOUNTER (EMERGENCY)
Dept: HOSPITAL 80 - FED | Age: 83
Discharge: HOME | End: 2018-10-31
Payer: COMMERCIAL

## 2018-10-31 VITALS — SYSTOLIC BLOOD PRESSURE: 136 MMHG | DIASTOLIC BLOOD PRESSURE: 73 MMHG

## 2018-10-31 DIAGNOSIS — I10: ICD-10-CM

## 2018-10-31 DIAGNOSIS — R53.1: Primary | ICD-10-CM

## 2018-10-31 DIAGNOSIS — K50.90: ICD-10-CM

## 2018-10-31 LAB — PLATELET # BLD: 294 10^3/UL (ref 150–400)

## 2018-10-31 NOTE — CPEKG
Test Reason : OPEN

Blood Pressure : ***/*** mmHG

Vent. Rate : 075 BPM     Atrial Rate : 075 BPM

   P-R Int : 156 ms          QRS Dur : 091 ms

    QT Int : 396 ms       P-R-T Axes : 083 071 046 degrees

   QTc Int : 443 ms

 

Sinus rhythm

Low voltage, precordial leads

 

Confirmed by Manish Singh ([id number]) on 10/31/2018 1:12:47 PM

 

Referred By:             Confirmed By:Manish Singh

## 2018-10-31 NOTE — EDPHY
H & P


Time Seen by Provider: 10/31/18 13:04


HPI/ROS: 





CHIEF COMPLAINT:  "I am weak because I missed breakfast"





HISTORY OF PRESENT ILLNESS:  Patient's  is in the hospital on the 3rd 

floor after a fall.  She took the ambulance here today because she said she 

felt weak because she missed breakfast.  She mainly wants food.  She does not 

have any focal weakness in extremities, no pain anywhere, no other complaints.


She says she is able to walk.  Normal speech and normal thought process.





REVIEW OF SYSTEMS:


Eye: no change in vision


ENT: no sore throat


Cardiac: no chest pain or syncope


Pulmonary: no cough or SOB


Abdomen: no vomiting, diarrhea, abdominal pain


Musculoskeletal: no back pain


Skin: no rash


Neuro: no headache


Constitutional: no fever


: no urinary symptoms, no dysuria or hematuria.





A comprehensive 10 point review of systems is otherwise negative aside from 

elements mentioned in the history of present illness.





PAST MEDICAL HISTORY:  Admission from 8/14-8/17/2017 personally reviewed, 

includes Crohn's disease and hypertension and history of C diff.





Social history:  ,  is in the hospital





General Appearance: Alert and conversant, cooperative.


Eyes:  No scleral icterus.  


ENT, Mouth:  Slightly dry mucous membranes.


Respiratory: Normal respiratory effort, breath sounds equal, lungs are clear to 

auscultation.


Cardiovascular:  Regular rate and rhythm.


Gastrointestinal:  Abdomen is soft and non tender.


Neurological: Alert, face symmetric, normal motor and sensory in extremities.   

Speech is fluent and normally conversant.


Skin: Warm and dry, no rashes.


Musculoskeletal: No peripheral edema.


Psychiatric: Not agitated.





Emergency Department course/MDM:





CBC and chemistry, EKG reviewed, food tray ordered.


She probably is weak because she missed breakfast and does not appear to have 

stroke, acute infectious process, ACS, metabolic abnormality





1416:  Patient's daughter says she normally runs a elevated white blood cell 

count.  She does not clinically have an infection.  No ENT symptoms, no 

vomiting or diarrhea, no abdominal pain, no cough or hypoxemia.  Urinalysis 

checked.


No cellulitis.  Ambulatory at this time, to the bathroom, feels better after 

eating.





1429:  Urinalysis negative for nitrates or leukocyte esterase.  Daughter 

emphasize that WBC is often elevated, she is Druze , they would 

like to have as little medical testing and intervention as possible.  I warned 

that it should be rechecked.  I think at this point it is reasonable to have 

her go join her  which is what she wants.  UA is reviewed but does not 

appear to be responsible for WBC of 19,000. 


Smoking Status: Never smoked


Constitutional: 


 Initial Vital Signs











Temperature (C)  36.8 C   10/31/18 12:56


 


Heart Rate  76   10/31/18 12:56


 


Respiratory Rate  16   10/31/18 12:56


 


Blood Pressure  131/85 H  10/31/18 12:56


 


O2 Sat (%)  95   10/31/18 12:56








 











O2 Delivery Mode               Room Air














Allergies/Adverse Reactions: 


 





No Known Allergies Allergy (Verified 08/14/17 12:48)


 











Medical Decision Making





- Diagnostics


EKG Interpretation: 





12-lead EKG interpreted by me; official reading is in computer system.  My 

interpretation is sinus rhythm rate 75 with low precordial lead voltage 

otherwise normal.





- Data Points


Laboratory Results: 


 Laboratory Results





 10/31/18 13:28 





 10/31/18 13:28 





 











  10/31/18 10/31/18 10/31/18





  14:19 13:28 13:28


 


WBC      19.86 10^3/uL H 10^3/uL





     (3.80-9.50) 


 


RBC      4.26 10^6/uL 10^6/uL





     (4.18-5.33) 


 


Hgb      13.2 g/dL g/dL





     (12.6-16.3) 


 


Hct      39.4 % %





     (38.0-47.0) 


 


MCV      92.5 fL fL





     (81.5-99.8) 


 


MCH      31.0 pg pg





     (27.9-34.1) 


 


MCHC      33.5 g/dL g/dL





     (32.4-36.7) 


 


RDW      14.0 % %





     (11.5-15.2) 


 


Plt Count      294 10^3/uL 10^3/uL





     (150-400) 


 


MPV      10.9 fL fL





     (8.7-11.7) 


 


Neut % (Auto)      81.8 % H %





     (39.3-74.2) 


 


Lymph % (Auto)      10.6 % L %





     (15.0-45.0) 


 


Mono % (Auto)      6.8 % %





     (4.5-13.0) 


 


Eos % (Auto)      0.1 % L %





     (0.6-7.6) 


 


Baso % (Auto)      0.3 % %





     (0.3-1.7) 


 


Nucleat RBC Rel Count      0.0 % %





     (0.0-0.2) 


 


Absolute Neuts (auto)      16.28 10^3/uL H 10^3/uL





     (1.70-6.50) 


 


Absolute Lymphs (auto)      2.10 10^3/uL 10^3/uL





     (1.00-3.00) 


 


Absolute Monos (auto)      1.35 10^3/uL H 10^3/uL





     (0.30-0.80) 


 


Absolute Eos (auto)      0.01 10^3/uL L 10^3/uL





     (0.03-0.40) 


 


Absolute Basos (auto)      0.05 10^3/uL 10^3/uL





     (0.02-0.10) 


 


Absolute Nucleated RBC      0.00 10^3/uL 10^3/uL





     (0-0.01) 


 


Immature Gran %      0.4 % %





     (0.0-1.1) 


 


Immature Gran #      0.07 10^3/uL 10^3/uL





     (0.00-0.10) 


 


Sodium    140 mEq/L mEq/L  





    (135-145)  


 


Potassium    4.1 mEq/L mEq/L  





    (3.3-5.0)  


 


Chloride    106 mEq/L mEq/L  





    ()  


 


Carbon Dioxide    23 mEq/l mEq/l  





    (22-31)  


 


Anion Gap    11 mEq/L mEq/L  





    (6-14)  


 


BUN    13 mg/dL mg/dL  





    (7-23)  


 


Creatinine    0.8 mg/dL mg/dL  





    (0.6-1.0)  


 


Estimated GFR    > 60   





    


 


Glucose    114 mg/dL H mg/dL  





    ()  


 


Calcium    9.7 mg/dL mg/dL  





    (8.5-10.4)  


 


Urine Color  YELLOW     





    


 


Urine Appearance  HAZY     





    


 


Urine pH  6.0     





   (5.0-7.5)   


 


Ur Specific Gravity  1.018     





   (1.002-1.030)   


 


Urine Protein  1+  H     





   (NEGATIVE)   


 


Urine Ketones  1+  H     





   (NEGATIVE)   


 


Urine Blood  2+  H     





   (NEGATIVE)   


 


Urine Nitrate  NEGATIVE     





   (NEGATIVE)   


 


Urine Bilirubin  NEGATIVE     





   (NEGATIVE)   


 


Urine Urobilinogen  NEGATIVE EU EU    





   (0.2-1.0)   


 


Ur Leukocyte Esterase  NEGATIVE     





   (NEGATIVE)   


 


Urine RBC  5-10 /hpf H /hpf    





   (0-3)   


 


Urine WBC  5-10 /hpf H /hpf    





   (0-3)   


 


Ur Epithelial Cells  TRACE /lpf /lpf    





   (NONE-1+)   


 


Hyaline Casts  5-15 /lpf /lpf    





   (0-1)   


 


Urine Mucus  TRACE /lpf /lpf    





   (NONE-1+)   


 


Urine Glucose  NEGATIVE     





   (NEGATIVE)   














Departure





- Departure


Disposition: Home, Routine, Self-Care


Clinical Impression: 


 Weakness





Condition: Good


Instructions:  Weakness (ED)


Additional Instructions: 


You had an elevated white blood cell count in the emergency department, and 

should get that rechecked later this week in 48 hr.


Referrals: 


Halle Alegre MD [place] [place] - As per Instructions

## 2018-10-31 NOTE — ASMTCMCOM
CM Note

 

CM Note                       

Notes:

Pt presented to the ED via EMS from her Independent Living facility at [facility] 

([phone]). Pt's , Mika, has recently been admitted to [facility] and is being discharged to 


a SNF. This CM checked in w/pt and her granddaughter, Sue Gutierrez, who was at bedside in the ED, to 


confirm that pt was safe to return to her Ind Living situation w/o Mika being there. Sue states 

that she recently added on Personalized Living - Care services through [facility] and she feels pt is 


safe to return home. Pt needs to have her blood work re-checked in the next 48hrs due to her high 

WBC. Pt was discharged from the ED w/Sue. 



This CM called the [facility] and spoke w/LUIS Recinos w/their Personalized Living department and she 

states that the only assistance they have been set up to provide is escorting the pt to and from 

meals. 



This CM called Sue ([phone]) and she said she plans on contacting the [facility] and adding on 

additional care services that include pt being enrolled w/their primary care team so they can order 


the repeat bloodwork, etc. 



CM available for further assistance if needed. 

 

Date Signed:  10/31/2018 05:41 PM

Electronically Signed By:Elisabeth Samaniego RN